# Patient Record
Sex: FEMALE | Race: WHITE | URBAN - METROPOLITAN AREA
[De-identification: names, ages, dates, MRNs, and addresses within clinical notes are randomized per-mention and may not be internally consistent; named-entity substitution may affect disease eponyms.]

---

## 2017-01-26 ENCOUNTER — OFFICE VISIT (OUTPATIENT)
Dept: FAMILY MEDICINE CLINIC | Age: 37
End: 2017-01-26

## 2017-01-26 VITALS
HEIGHT: 67 IN | WEIGHT: 184 LBS | RESPIRATION RATE: 16 BRPM | BODY MASS INDEX: 28.88 KG/M2 | DIASTOLIC BLOOD PRESSURE: 80 MMHG | SYSTOLIC BLOOD PRESSURE: 114 MMHG | HEART RATE: 96 BPM | TEMPERATURE: 98.1 F

## 2017-01-26 DIAGNOSIS — K21.00 GASTROESOPHAGEAL REFLUX DISEASE WITH ESOPHAGITIS: ICD-10-CM

## 2017-01-26 DIAGNOSIS — J01.90 ACUTE SINUSITIS, RECURRENCE NOT SPECIFIED, UNSPECIFIED LOCATION: Primary | ICD-10-CM

## 2017-01-26 DIAGNOSIS — R42 VERTIGO: ICD-10-CM

## 2017-01-26 PROCEDURE — 99214 OFFICE O/P EST MOD 30 MIN: CPT | Performed by: FAMILY MEDICINE

## 2017-01-26 RX ORDER — CEFUROXIME AXETIL 250 MG/1
250 TABLET ORAL 2 TIMES DAILY
Qty: 20 TABLET | Refills: 0 | Status: SHIPPED | OUTPATIENT
Start: 2017-01-26 | End: 2017-02-05

## 2017-01-26 RX ORDER — RANITIDINE 150 MG/1
150 TABLET ORAL 2 TIMES DAILY
Qty: 60 TABLET | Refills: 3 | Status: SHIPPED | OUTPATIENT
Start: 2017-01-26 | End: 2017-06-10 | Stop reason: SDUPTHER

## 2017-03-27 RX ORDER — LEVOTHYROXINE SODIUM 0.05 MG/1
TABLET ORAL
Qty: 90 TABLET | Refills: 0 | Status: SHIPPED | OUTPATIENT
Start: 2017-03-27 | End: 2017-04-01

## 2017-04-01 RX ORDER — LEVOTHYROXINE SODIUM 0.05 MG/1
TABLET ORAL
Qty: 90 TABLET | Refills: 0 | Status: SHIPPED | OUTPATIENT
Start: 2017-04-01 | End: 2017-06-26

## 2017-06-10 DIAGNOSIS — K21.00 GASTROESOPHAGEAL REFLUX DISEASE WITH ESOPHAGITIS: ICD-10-CM

## 2017-06-10 RX ORDER — RANITIDINE 150 MG/1
TABLET ORAL
Qty: 60 TABLET | Refills: 2 | Status: SHIPPED | OUTPATIENT
Start: 2017-06-10 | End: 2017-08-30

## 2017-06-26 RX ORDER — LEVOTHYROXINE SODIUM 0.05 MG/1
TABLET ORAL
Qty: 90 TABLET | Refills: 0 | Status: SHIPPED | OUTPATIENT
Start: 2017-06-26 | End: 2017-08-30 | Stop reason: SDUPTHER

## 2017-08-21 ENCOUNTER — TELEPHONE (OUTPATIENT)
Dept: FAMILY MEDICINE CLINIC | Age: 37
End: 2017-08-21

## 2017-08-30 ENCOUNTER — OFFICE VISIT (OUTPATIENT)
Dept: FAMILY MEDICINE CLINIC | Age: 37
End: 2017-08-30

## 2017-08-30 VITALS
HEIGHT: 67 IN | SYSTOLIC BLOOD PRESSURE: 114 MMHG | DIASTOLIC BLOOD PRESSURE: 72 MMHG | RESPIRATION RATE: 16 BRPM | TEMPERATURE: 98.8 F | HEART RATE: 80 BPM | BODY MASS INDEX: 28.09 KG/M2 | WEIGHT: 179 LBS

## 2017-08-30 DIAGNOSIS — F33.42 RECURRENT MAJOR DEPRESSIVE DISORDER, IN FULL REMISSION (HCC): ICD-10-CM

## 2017-08-30 DIAGNOSIS — Z00.00 WELL ADULT HEALTH CHECK: Primary | ICD-10-CM

## 2017-08-30 DIAGNOSIS — E03.9 ACQUIRED HYPOTHYROIDISM: ICD-10-CM

## 2017-08-30 DIAGNOSIS — F41.9 ANXIETY: ICD-10-CM

## 2017-08-30 DIAGNOSIS — E55.9 VITAMIN D DEFICIENCY: ICD-10-CM

## 2017-08-30 DIAGNOSIS — I10 ESSENTIAL HYPERTENSION: ICD-10-CM

## 2017-08-30 DIAGNOSIS — Z11.4 SCREENING FOR HIV (HUMAN IMMUNODEFICIENCY VIRUS): ICD-10-CM

## 2017-08-30 DIAGNOSIS — K21.00 GASTROESOPHAGEAL REFLUX DISEASE WITH ESOPHAGITIS: ICD-10-CM

## 2017-08-30 DIAGNOSIS — E78.2 MIXED HYPERLIPIDEMIA: ICD-10-CM

## 2017-08-30 DIAGNOSIS — Z11.59 NEED FOR HEPATITIS C SCREENING TEST: ICD-10-CM

## 2017-08-30 DIAGNOSIS — K22.70 BARRETT'S ESOPHAGUS WITHOUT DYSPLASIA: ICD-10-CM

## 2017-08-30 LAB
A/G RATIO: 1.5 (ref 1.1–2.2)
ALBUMIN SERPL-MCNC: 4.4 G/DL (ref 3.4–5)
ALP BLD-CCNC: 44 U/L (ref 40–129)
ALT SERPL-CCNC: 9 U/L (ref 10–40)
ANION GAP SERPL CALCULATED.3IONS-SCNC: 13 MMOL/L (ref 3–16)
AST SERPL-CCNC: 15 U/L (ref 15–37)
BILIRUB SERPL-MCNC: 0.7 MG/DL (ref 0–1)
BUN BLDV-MCNC: 8 MG/DL (ref 7–20)
CALCIUM SERPL-MCNC: 9.7 MG/DL (ref 8.3–10.6)
CHLORIDE BLD-SCNC: 99 MMOL/L (ref 99–110)
CHOLESTEROL, TOTAL: 197 MG/DL (ref 0–199)
CO2: 26 MMOL/L (ref 21–32)
CREAT SERPL-MCNC: 0.6 MG/DL (ref 0.6–1.1)
GFR AFRICAN AMERICAN: >60
GFR NON-AFRICAN AMERICAN: >60
GLOBULIN: 2.9 G/DL
GLUCOSE BLD-MCNC: 87 MG/DL (ref 70–99)
HDLC SERPL-MCNC: 74 MG/DL (ref 40–60)
HEPATITIS C ANTIBODY INTERPRETATION: NORMAL
LDL CHOLESTEROL CALCULATED: 111 MG/DL
POTASSIUM SERPL-SCNC: 4.8 MMOL/L (ref 3.5–5.1)
SODIUM BLD-SCNC: 138 MMOL/L (ref 136–145)
TOTAL PROTEIN: 7.3 G/DL (ref 6.4–8.2)
TRIGL SERPL-MCNC: 60 MG/DL (ref 0–150)
TSH SERPL DL<=0.05 MIU/L-ACNC: 1.68 UIU/ML (ref 0.27–4.2)
VITAMIN D 25-HYDROXY: 33.9 NG/ML
VLDLC SERPL CALC-MCNC: 12 MG/DL

## 2017-08-30 PROCEDURE — 99395 PREV VISIT EST AGE 18-39: CPT | Performed by: FAMILY MEDICINE

## 2017-08-30 PROCEDURE — G0444 DEPRESSION SCREEN ANNUAL: HCPCS | Performed by: FAMILY MEDICINE

## 2017-08-30 RX ORDER — OMEPRAZOLE 20 MG/1
20 CAPSULE, DELAYED RELEASE ORAL DAILY
Qty: 30 CAPSULE | Refills: 3 | COMMUNITY
Start: 2017-08-30 | End: 2019-07-05

## 2017-08-30 RX ORDER — ZOLPIDEM TARTRATE 10 MG/1
10 TABLET ORAL NIGHTLY PRN
COMMUNITY

## 2017-08-30 RX ORDER — LEVOTHYROXINE SODIUM 0.05 MG/1
TABLET ORAL
Qty: 90 TABLET | Refills: 3 | Status: SHIPPED | OUTPATIENT
Start: 2017-08-30 | End: 2018-09-13 | Stop reason: SDUPTHER

## 2017-08-30 ASSESSMENT — PATIENT HEALTH QUESTIONNAIRE - PHQ9
6. FEELING BAD ABOUT YOURSELF - OR THAT YOU ARE A FAILURE OR HAVE LET YOURSELF OR YOUR FAMILY DOWN: 2
8. MOVING OR SPEAKING SO SLOWLY THAT OTHER PEOPLE COULD HAVE NOTICED. OR THE OPPOSITE, BEING SO FIGETY OR RESTLESS THAT YOU HAVE BEEN MOVING AROUND A LOT MORE THAN USUAL: 2
4. FEELING TIRED OR HAVING LITTLE ENERGY: 2
9. THOUGHTS THAT YOU WOULD BE BETTER OFF DEAD, OR OF HURTING YOURSELF: 0
3. TROUBLE FALLING OR STAYING ASLEEP: 3
1. LITTLE INTEREST OR PLEASURE IN DOING THINGS: 2
5. POOR APPETITE OR OVEREATING: 2
10. IF YOU CHECKED OFF ANY PROBLEMS, HOW DIFFICULT HAVE THESE PROBLEMS MADE IT FOR YOU TO DO YOUR WORK, TAKE CARE OF THINGS AT HOME, OR GET ALONG WITH OTHER PEOPLE: 0
SUM OF ALL RESPONSES TO PHQ9 QUESTIONS 1 & 2: 4
SUM OF ALL RESPONSES TO PHQ QUESTIONS 1-9: 17
2. FEELING DOWN, DEPRESSED OR HOPELESS: 2
7. TROUBLE CONCENTRATING ON THINGS, SUCH AS READING THE NEWSPAPER OR WATCHING TELEVISION: 2

## 2018-09-05 ENCOUNTER — OFFICE VISIT (OUTPATIENT)
Dept: FAMILY MEDICINE CLINIC | Age: 38
End: 2018-09-05

## 2018-09-05 VITALS
RESPIRATION RATE: 16 BRPM | SYSTOLIC BLOOD PRESSURE: 118 MMHG | DIASTOLIC BLOOD PRESSURE: 78 MMHG | HEART RATE: 72 BPM | BODY MASS INDEX: 28.72 KG/M2 | HEIGHT: 67 IN | TEMPERATURE: 99 F | WEIGHT: 183 LBS

## 2018-09-05 DIAGNOSIS — E03.9 ACQUIRED HYPOTHYROIDISM: Primary | ICD-10-CM

## 2018-09-05 DIAGNOSIS — E03.9 ACQUIRED HYPOTHYROIDISM: ICD-10-CM

## 2018-09-05 LAB — TSH SERPL DL<=0.05 MIU/L-ACNC: 1.59 UIU/ML (ref 0.27–4.2)

## 2018-09-05 PROCEDURE — 99213 OFFICE O/P EST LOW 20 MIN: CPT | Performed by: FAMILY MEDICINE

## 2018-09-05 ASSESSMENT — PATIENT HEALTH QUESTIONNAIRE - PHQ9
SUM OF ALL RESPONSES TO PHQ9 QUESTIONS 1 & 2: 0
SUM OF ALL RESPONSES TO PHQ QUESTIONS 1-9: 0
SUM OF ALL RESPONSES TO PHQ QUESTIONS 1-9: 0
2. FEELING DOWN, DEPRESSED OR HOPELESS: 0
1. LITTLE INTEREST OR PLEASURE IN DOING THINGS: 0

## 2018-09-05 NOTE — PROGRESS NOTES
Scribe documentation: Viet Loera am scribing for Magnus Shone, MD. Electronically signed by Kristyn Zavaleta  on 9/5/2018 at 1:00 PM  MD Attestation: Valerioin Lalo,  personally performed the services described in this documentation, as scribed by the user listed above, and it is both accurate and complete. I agree with the Chief Complaint, ROS, and Past Histories independently gathered by the clinical support staff. 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 Exercise: walking twice a day     Seatbelt use: Always     The ASCVD Risk score (Ramos Frias, et al., 2013) failed to calculate for the following reasons: The 2013 ASCVD risk score is only valid for ages 36 to 78  Prior to Visit Medications    Medication Sig Taking?  Authorizing Provider   zolpidem (AMBIEN) 10 MG tablet Take 10 mg by mouth nightly as needed for Sleep Yes Historical Provider, MD   levothyroxine (SYNTHROID) 50 MCG tablet TAKE ONE TABLET BY MOUTH DAILY Yes Leigh Byers MD   omeprazole (PRILOSEC) 20 MG delayed release capsule Take 1 capsule by mouth Daily Yes Leigh Byers MD   vitamin D (ERGOCALCIFEROL) 89536 UNITS CAPS capsule Take 1 capsule by mouth Twice a Week Yes Jessy Engel APRN - MANUEL   albuterol

## 2018-09-13 DIAGNOSIS — E03.9 ACQUIRED HYPOTHYROIDISM: ICD-10-CM

## 2018-09-14 RX ORDER — LEVOTHYROXINE SODIUM 0.05 MG/1
TABLET ORAL
Qty: 90 TABLET | Refills: 2 | Status: SHIPPED | OUTPATIENT
Start: 2018-09-14 | End: 2018-09-17 | Stop reason: SDUPTHER

## 2019-06-15 DIAGNOSIS — E03.9 ACQUIRED HYPOTHYROIDISM: ICD-10-CM

## 2019-06-17 RX ORDER — LEVOTHYROXINE SODIUM 0.05 MG/1
TABLET ORAL
Qty: 90 TABLET | Refills: 0 | Status: SHIPPED | OUTPATIENT
Start: 2019-06-17 | End: 2019-07-05 | Stop reason: SDUPTHER

## 2019-07-05 ENCOUNTER — OFFICE VISIT (OUTPATIENT)
Dept: FAMILY MEDICINE CLINIC | Age: 39
End: 2019-07-05
Payer: COMMERCIAL

## 2019-07-05 VITALS
RESPIRATION RATE: 16 BRPM | WEIGHT: 153 LBS | SYSTOLIC BLOOD PRESSURE: 118 MMHG | DIASTOLIC BLOOD PRESSURE: 72 MMHG | HEART RATE: 70 BPM | BODY MASS INDEX: 24.01 KG/M2 | HEIGHT: 67 IN | TEMPERATURE: 98 F

## 2019-07-05 DIAGNOSIS — F33.42 RECURRENT MAJOR DEPRESSIVE DISORDER, IN FULL REMISSION (HCC): ICD-10-CM

## 2019-07-05 DIAGNOSIS — Z00.00 WELL ADULT HEALTH CHECK: Primary | ICD-10-CM

## 2019-07-05 DIAGNOSIS — Z00.00 WELL ADULT HEALTH CHECK: ICD-10-CM

## 2019-07-05 DIAGNOSIS — E55.9 VITAMIN D DEFICIENCY: ICD-10-CM

## 2019-07-05 DIAGNOSIS — E03.9 ACQUIRED HYPOTHYROIDISM: ICD-10-CM

## 2019-07-05 DIAGNOSIS — K21.00 GASTROESOPHAGEAL REFLUX DISEASE WITH ESOPHAGITIS: ICD-10-CM

## 2019-07-05 LAB
A/G RATIO: 1.8 (ref 1.1–2.2)
ALBUMIN SERPL-MCNC: 4.4 G/DL (ref 3.4–5)
ALP BLD-CCNC: 42 U/L (ref 40–129)
ALT SERPL-CCNC: 11 U/L (ref 10–40)
ANION GAP SERPL CALCULATED.3IONS-SCNC: 9 MMOL/L (ref 3–16)
AST SERPL-CCNC: 18 U/L (ref 15–37)
BASOPHILS ABSOLUTE: 0 K/UL (ref 0–0.2)
BASOPHILS RELATIVE PERCENT: 0.3 %
BILIRUB SERPL-MCNC: 0.6 MG/DL (ref 0–1)
BUN BLDV-MCNC: 12 MG/DL (ref 7–20)
CALCIUM SERPL-MCNC: 9.3 MG/DL (ref 8.3–10.6)
CHLORIDE BLD-SCNC: 103 MMOL/L (ref 99–110)
CO2: 26 MMOL/L (ref 21–32)
CREAT SERPL-MCNC: 0.6 MG/DL (ref 0.6–1.1)
EOSINOPHILS ABSOLUTE: 0.2 K/UL (ref 0–0.6)
EOSINOPHILS RELATIVE PERCENT: 4 %
GFR AFRICAN AMERICAN: >60
GFR NON-AFRICAN AMERICAN: >60
GLOBULIN: 2.5 G/DL
GLUCOSE BLD-MCNC: 81 MG/DL (ref 70–99)
HCT VFR BLD CALC: 40.1 % (ref 36–48)
HEMOGLOBIN: 13.8 G/DL (ref 12–16)
LYMPHOCYTES ABSOLUTE: 1 K/UL (ref 1–5.1)
LYMPHOCYTES RELATIVE PERCENT: 22.2 %
MCH RBC QN AUTO: 30.8 PG (ref 26–34)
MCHC RBC AUTO-ENTMCNC: 34.3 G/DL (ref 31–36)
MCV RBC AUTO: 89.6 FL (ref 80–100)
MONOCYTES ABSOLUTE: 0.3 K/UL (ref 0–1.3)
MONOCYTES RELATIVE PERCENT: 5.6 %
NEUTROPHILS ABSOLUTE: 3.1 K/UL (ref 1.7–7.7)
NEUTROPHILS RELATIVE PERCENT: 67.9 %
PDW BLD-RTO: 13.7 % (ref 12.4–15.4)
PLATELET # BLD: 243 K/UL (ref 135–450)
PMV BLD AUTO: 7.8 FL (ref 5–10.5)
POTASSIUM SERPL-SCNC: 4.6 MMOL/L (ref 3.5–5.1)
RBC # BLD: 4.47 M/UL (ref 4–5.2)
SODIUM BLD-SCNC: 138 MMOL/L (ref 136–145)
TOTAL PROTEIN: 6.9 G/DL (ref 6.4–8.2)
TSH SERPL DL<=0.05 MIU/L-ACNC: 2.33 UIU/ML (ref 0.27–4.2)
WBC # BLD: 4.6 K/UL (ref 4–11)

## 2019-07-05 PROCEDURE — 99396 PREV VISIT EST AGE 40-64: CPT | Performed by: FAMILY MEDICINE

## 2019-07-05 RX ORDER — LEVOTHYROXINE SODIUM 0.05 MG/1
TABLET ORAL
Qty: 90 TABLET | Refills: 0 | Status: SHIPPED | OUTPATIENT
Start: 2019-07-05 | End: 2019-12-10 | Stop reason: SDUPTHER

## 2019-07-05 RX ORDER — VALACYCLOVIR HYDROCHLORIDE 1 G/1
1000 TABLET, FILM COATED ORAL PRN
Status: SHIPPED | COMMUNITY
Start: 2019-07-05

## 2019-07-05 RX ORDER — CITALOPRAM 10 MG/1
10 TABLET ORAL DAILY
COMMUNITY

## 2019-07-05 RX ORDER — LORAZEPAM 0.5 MG/1
0.5 TABLET ORAL EVERY 6 HOURS PRN
COMMUNITY
End: 2019-07-05 | Stop reason: SDUPTHER

## 2019-07-05 RX ORDER — LORAZEPAM 0.5 MG/1
0.5 TABLET ORAL 2 TIMES DAILY
COMMUNITY
Start: 2019-07-05 | End: 2020-10-02 | Stop reason: ALTCHOICE

## 2019-07-05 ASSESSMENT — PATIENT HEALTH QUESTIONNAIRE - PHQ9
2. FEELING DOWN, DEPRESSED OR HOPELESS: 0
SUM OF ALL RESPONSES TO PHQ QUESTIONS 1-9: 0
SUM OF ALL RESPONSES TO PHQ9 QUESTIONS 1 & 2: 0
SUM OF ALL RESPONSES TO PHQ QUESTIONS 1-9: 0
1. LITTLE INTEREST OR PLEASURE IN DOING THINGS: 0

## 2019-07-05 NOTE — PATIENT INSTRUCTIONS
INSTRUCTIONS  · NEXT APPOINTMENT: Please schedule annual complete physical (30 minutes) in one year. · PLEASE TAKE THIS FORM TO CHECK-OUT WINDOW TO SCHEDULE NEXT VISIT. · PLEASE GET BLOODWORK DRAWN TODAY ON FIRST FLOOR in 170. Take orders with you. RESULTS- most blood tests back in couple days. We will call you if any problems. If bloodwork good, you will get letter in mail or notified thru [address] (if signed up) within 2 weeks. If you do not, please call office. · Please get flu vaccine when available in fall. Can get either at this office or at stores such as [employer] and [employer]. · Ask GYN to fax PAP result to Dr. Serina Odom at [phone].

## 2019-07-07 LAB — VITAMIN D 25-HYDROXY: 40.2 NG/ML

## 2019-07-08 LAB
CHOLESTEROL, TOTAL: 193 MG/DL (ref 0–199)
HDLC SERPL-MCNC: 74 MG/DL (ref 40–60)
LDL CHOLESTEROL CALCULATED: 109 MG/DL
TRIGL SERPL-MCNC: 52 MG/DL (ref 0–150)
VLDLC SERPL CALC-MCNC: 10 MG/DL

## 2019-12-10 ENCOUNTER — TELEPHONE (OUTPATIENT)
Dept: FAMILY MEDICINE CLINIC | Age: 39
End: 2019-12-10

## 2020-06-04 RX ORDER — LEVOTHYROXINE SODIUM 0.05 MG/1
TABLET ORAL
Qty: 90 TABLET | Refills: 0 | Status: SHIPPED | OUTPATIENT
Start: 2020-06-04 | End: 2020-08-31

## 2020-08-31 ENCOUNTER — PATIENT MESSAGE (OUTPATIENT)
Dept: FAMILY MEDICINE CLINIC | Age: 40
End: 2020-08-31

## 2020-08-31 RX ORDER — LEVOTHYROXINE SODIUM 0.05 MG/1
TABLET ORAL
Qty: 90 TABLET | Refills: 0 | OUTPATIENT
Start: 2020-08-31

## 2020-08-31 RX ORDER — LEVOTHYROXINE SODIUM 0.05 MG/1
TABLET ORAL
Qty: 90 TABLET | Refills: 0 | Status: SHIPPED | OUTPATIENT
Start: 2020-08-31 | End: 2020-09-01

## 2020-08-31 NOTE — TELEPHONE ENCOUNTER
From: Augustin Boyle  To: ANN Caruso  Sent: 1/79/1962 10:38 AM EDT  Subject: RE:(No subject)    Michael Reddy,    I hope you are well. I have scheduled a first available appointment for October 2nd at 1pm. I need a refill on my hypothyroidism medication within the next 7 days. How can I ensure I will have a supply of medication? Thank you for your help. All the Joycelyndie Older      ----- Message -----   From:ANN Caruso   NNMN:6/86/3734 9:36 AM EDT   To:Vivi Villagran   Subject:    Good morning. You were due for a yearly physical and fasting labs in July. Please call [phone] to schedule.  Thank you

## 2020-09-01 RX ORDER — LEVOTHYROXINE SODIUM 0.05 MG/1
TABLET ORAL
Qty: 90 TABLET | Refills: 0 | Status: SHIPPED | OUTPATIENT
Start: 2020-09-01 | End: 2020-10-02 | Stop reason: SDUPTHER

## 2020-10-02 ENCOUNTER — OFFICE VISIT (OUTPATIENT)
Dept: FAMILY MEDICINE CLINIC | Age: 40
End: 2020-10-02
Payer: COMMERCIAL

## 2020-10-02 VITALS
RESPIRATION RATE: 10 BRPM | OXYGEN SATURATION: 99 % | DIASTOLIC BLOOD PRESSURE: 80 MMHG | SYSTOLIC BLOOD PRESSURE: 120 MMHG | HEART RATE: 76 BPM | BODY MASS INDEX: 23.7 KG/M2 | WEIGHT: 151 LBS | HEIGHT: 67 IN

## 2020-10-02 DIAGNOSIS — Z00.00 WELL ADULT EXAM: ICD-10-CM

## 2020-10-02 DIAGNOSIS — E55.9 VITAMIN D DEFICIENCY: ICD-10-CM

## 2020-10-02 DIAGNOSIS — E03.9 ACQUIRED HYPOTHYROIDISM: ICD-10-CM

## 2020-10-02 LAB
A/G RATIO: 1.9 (ref 1.1–2.2)
ALBUMIN SERPL-MCNC: 4.6 G/DL (ref 3.4–5)
ALP BLD-CCNC: 41 U/L (ref 40–129)
ALT SERPL-CCNC: 24 U/L (ref 10–40)
ANION GAP SERPL CALCULATED.3IONS-SCNC: 10 MMOL/L (ref 3–16)
AST SERPL-CCNC: 21 U/L (ref 15–37)
BILIRUB SERPL-MCNC: 0.5 MG/DL (ref 0–1)
BUN BLDV-MCNC: 16 MG/DL (ref 7–20)
CALCIUM SERPL-MCNC: 9.6 MG/DL (ref 8.3–10.6)
CHLORIDE BLD-SCNC: 104 MMOL/L (ref 99–110)
CO2: 26 MMOL/L (ref 21–32)
CREAT SERPL-MCNC: 0.6 MG/DL (ref 0.6–1.1)
GFR AFRICAN AMERICAN: >60
GFR NON-AFRICAN AMERICAN: >60
GLOBULIN: 2.4 G/DL
GLUCOSE BLD-MCNC: 82 MG/DL (ref 70–99)
POTASSIUM SERPL-SCNC: 5.6 MMOL/L (ref 3.5–5.1)
SODIUM BLD-SCNC: 140 MMOL/L (ref 136–145)
TOTAL PROTEIN: 7 G/DL (ref 6.4–8.2)
TSH REFLEX: 1.59 UIU/ML (ref 0.27–4.2)
VITAMIN D 25-HYDROXY: 68.8 NG/ML

## 2020-10-02 PROCEDURE — 99396 PREV VISIT EST AGE 40-64: CPT | Performed by: NURSE PRACTITIONER

## 2020-10-02 RX ORDER — LEVOTHYROXINE SODIUM 0.05 MG/1
TABLET ORAL
Qty: 90 TABLET | Refills: 3 | Status: SHIPPED | OUTPATIENT
Start: 2020-10-02 | End: 2021-11-01

## 2020-10-02 NOTE — PROGRESS NOTES
History and Physical      Hillary Quintero  YOB: 1980    Date of Service:  10/2/2020    Chief Complaint:   Hillary Quintero is a 38 y.o. female who presents for complete physical examination. Chief Complaint   Patient presents with    Annual Exam       HPI: Patient is here for her preventative physical.    Hypothyroidism: Recent symptoms: none. She denies fatigue, weight gain, weight loss, cold intolerance, heat intolerance, hair loss, dry skin, constipation, diarrhea, edema, anxiety, tremor, palpitations and dysphagia. Patient is  taking her medication consistently on an empty stomach. No results found for: [facility]  Lab Results   Component Value Date    TSH 2.33 07/05/2019    TSH 1.59 09/05/2018    TSH 1.68 08/30/2017     Depression/anxiety: mood is doing well. Patient follows with psychiatrist Dr. Rona Orourke. Follows every 3 months. No longer needing lorazepam.     Insomnia: stable. Follows with Dr. Rona Orourke. Planning on decreasing ambien dose. Allergic rhinitis: uses OTC nasal spray- provides good relief    Reports that her periods have gotten heavier. She will discuss this with her GYN at her next appointment in November.     Wt Readings from Last 3 Encounters:   10/02/20 151 lb (68.5 kg)   07/05/19 153 lb (69.4 kg)   09/05/18 183 lb (83 kg)     BP Readings from Last 3 Encounters:   10/02/20 120/80   07/05/19 118/72   09/05/18 118/78       Patient Active Problem List   Diagnosis    Acquired hypothyroidism    Anxiety    Idiopathic urticaria    Allergic rhinitis, seasonal    Thoracic back pain    Vitamin D deficiency    Gastroesophageal reflux disease with esophagitis    Recurrent major depressive disorder, in full remission [facility])       Preventive Care:  Health Maintenance   Topic Date Due    Cervical cancer screen  08/20/2017    TSH testing  07/05/2020    Flu vaccine (1) 09/01/2020    DTaP/Tdap/Td vaccine (3 - Td) 06/01/2021    Lipid screen  07/05/2024    Hepatitis A vaccine  Aged Out  Hepatitis B vaccine  Aged Out    Hib vaccine  Aged Out    Meningococcal (ACWY) vaccine  Aged Out    Pneumococcal 0-64 years Vaccine  Aged Out    HIV screen  Discontinued      Hx abnormal PAP: yes - was normal in 2019.   Follows with GYN Dr[facility] Mark Ponce at [facility]  Self-breast exams: yes  Previous DEXA scan: n/a  Last eye exam: 9/25/2020, normal  Exercise: runs on treadmill daily  Seatbelt use: always  Lipid panel:   Lab Results   Component Value Date    CHOL 193 07/05/2019    TRIG 52 07/05/2019    HDL 74 (H) 07/05/2019    LDLCALC 109 (H) 07/05/2019      The 10-year ASCVD risk score (Cait Rodriguez, et al., 2013) is: 0.3%    Values used to calculate the score:      Age: 38 years      Sex: Female      Is Non- : No      Diabetic: No      Tobacco smoker: No      Systolic Blood Pressure: 006 mmHg      Is BP treated: No      HDL Cholesterol: 74 mg/dL      Total Cholesterol: 193 mg/dL      Advance Directive: patient reports that she is working on this    Immunization History   Administered Date(s) Administered    Influenza Vaccine, unspecified formulation 09/18/2009, 11/01/2016    Tdap (Boostrix, Adacel) 09/18/2009, 06/01/2011       Allergies   Allergen Reactions    Doxycycline     Hyoscyamine Sulfate     Pamelor     Wellbutrin [Bupropion] Other (See Comments)     Increased Anxiety     Pcn [Penicillins] Other (See Comments)     Yeast infection    Prednisolone Nausea And Vomiting     Emotional problems     Outpatient Medications Marked as Taking for the 10/2/20 encounter (Office Visit) with RAMILA Iverson CNP   Medication Sig Dispense Refill    levothyroxine (SYNTHROID) 50 MCG tablet TAKE ONE TABLET BY MOUTH DAILY 90 tablet 0    citalopram (CELEXA) 10 MG tablet Take 10 mg by mouth daily      vitamin D (CHOLECALCIFEROL) 1000 UNIT TABS tablet Take 1 tablet by mouth daily 90 tablet 3    zolpidem (AMBIEN) 10 MG tablet Take 10 mg by mouth nightly as needed for Sleep         Past Medical History:   Diagnosis Date    Allergic rhinitis, seasonal 2011    Depression 2011    Genital herpes 2011    GERD (gastroesophageal reflux disease) 2011    Hyperlipemia 2011    Hypothyroidism 2011    Nephrolithiasis 2011    Self mutilation 2011     Past Surgical History:   Procedure Laterality Date    BREAST BIOPSY      benign    CHOLECYSTECTOMY, LAPAROSCOPIC  2010    COLONOSCOPY      neg    HERNIA REPAIR      left inguinal   [address]    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    Food insecurity     Worry: Not on file     Inability: Not on file    Transportation needs     Medical: Not on file     Non-medical: Not on file   Tobacco Use    Smoking status: Former Smoker     Last attempt to quit: 2009     Years since quittin.6    Smokeless tobacco: Never Used    Tobacco comment: advised not to resume   Substance and Sexual Activity    Alcohol use:  Yes     Alcohol/week: 0.0 standard drinks     Comment: rarely    Drug use: No    Sexual activity: Not on file   Lifestyle    Physical activity     Days per week: Not on file     Minutes per session: Not on file    Stress: Not on file   Relationships    Social connections     Talks on phone: Not on file Gets together: Not on file     Attends Rastafari service: Not on file     Active member of club or organization: Not on file     Attends meetings of clubs or organizations: Not on file     Relationship status: Not on file    Intimate partner violence     Fear of current or ex partner: Not on file     Emotionally abused: Not on file     Physically abused: Not on file     Forced sexual activity: Not on file   Other Topics Concern    Not on file   Social History Narrative    Lives with spouse daughter. Exercise: walking twice a day     Seatbelt use: Always       Review of Systems:  A comprehensive review of systems was negative except for what was noted in the HPI. Physical Exam:   Vitals:    10/02/20 1254   BP: 120/80   Site: Right Upper Arm   Position: Sitting   Cuff Size: Medium Adult   Pulse: 76   Resp: 10   SpO2: 99%   Weight: 151 lb (68.5 kg)   Height: 5' 7\" (1.702 m)     Body mass index is 23.65 kg/m². Constitutional: She is oriented to person, place, and time. She appears well-developed and well-nourished. No distress. HEENT:   Head: Normocephalic and atraumatic. Right Ear: Tympanic membrane, external ear and ear canal normal.   Left Ear: Tympanic membrane, external ear and ear canal normal.   Nose: Nose normal.   Mouth/Throat: Oropharynx is clear and moist, and mucous membranes are normal.  There is no cervical adenopathy. Eyes: Conjunctivae and extraocular motions are normal. Pupils are equal, round, and reactive to light. Neck: Neck supple. No JVD present. Carotid bruit is not present. No mass and no thyromegaly present. Cardiovascular: Normal rate, regular rhythm, normal heart sounds and intact distal pulses. Exam reveals no gallop and no friction rub. No murmur heard. Pulmonary/Chest: Effort normal and breath sounds normal. No respiratory distress. She has no wheezes, rhonchi or rales. Abdominal: Soft, non-tender.  Bowel sounds and aorta are normal. She exhibits no organomegaly, mass or bruit. Musculoskeletal: Normal range of motion, no synovitis. She exhibits no edema. Neurological: She is alert and oriented to person, place, and time. She has normal reflexes. No cranial nerve deficit. Coordination normal.   Skin: Skin is warm and dry. There is no rash or erythema. No suspicious lesions noted. Psychiatric: She has a normal mood and affect. Her speech is normal and behavior is normal. Judgment, cognition and memory are normal.     Assessment/Plan:    Branden Bello was seen today for annual exam.    Diagnoses and all orders for this visit:    Well adult exam  -     Comprehensive Metabolic Panel; Future  -     TSH with Reflex; Future  Healthy lifestyles reviewed: diet, aerobic exercise, sunscreen, vision and dental exams. Acquired hypothyroidism  -     levothyroxine (SYNTHROID) 50 MCG tablet; TAKE ONE TABLET BY MOUTH DAILY  -     TSH with Reflex; Future    Recurrent major depressive disorder, in full remission (HCC)  Stable on citalopram 10 mg daily. Continue f/u with psychiatrist Dr. Oscar Gates. Anxiety  Improved. On citalopram 10 mg daily. No longer needing lorazepam. Continue f/u with psychiatrist Dr. Oscar Gates. Insomnia, unspecified type  Improved. On zolpidem 10 mg qhs. Reports that she is going to work on weaning off medication. Continue f/u with psychiatrist Dr. Oscar Gates. Vitamin D deficiency  -     Vitamin D 25 Hydroxy; Future  On vitamin D 1000 IU daily    Continue f/u with GYN- scheduled for visit next month. She will obtain her flu vaccine at work.

## 2021-03-11 ENCOUNTER — TELEPHONE (OUTPATIENT)
Dept: FAMILY MEDICINE CLINIC | Age: 41
End: 2021-03-11

## 2021-03-11 NOTE — TELEPHONE ENCOUNTER
----- Message from Blane Valenzuela sent at 3/11/2021 11:23 AM EST -----  Subject: Message to Provider    QUESTIONS  Information for Provider? Patient in in need of a biometrics paper filled   out for insurance through her employer. She had a physical on 10/02/2020. Is she able to bring in this paperwork to have it filled out or would she   have to have another physical?   ---------------------------------------------------------------------------  --------------  [address]  What is the best way for the office to contact you? OK to leave message on   voicemail  Preferred Call Back Phone Number? [phone]  ---------------------------------------------------------------------------  --------------  SCRIPT ANSWERS  Relationship to Patient?  Self

## 2021-03-11 NOTE — TELEPHONE ENCOUNTER
Called pt and advised to fax over paperwork and if Dr Raydell Canavan has any concerns we will let her know. If not we will fill out the paperwork. Also advised to get the lab completed.

## 2021-03-17 ENCOUNTER — TELEPHONE (OUTPATIENT)
Dept: FAMILY MEDICINE CLINIC | Age: 41
End: 2021-03-17

## 2021-03-17 DIAGNOSIS — E87.5 HYPERKALEMIA: ICD-10-CM

## 2021-03-17 LAB
ANION GAP SERPL CALCULATED.3IONS-SCNC: 7 MMOL/L (ref 3–16)
BUN BLDV-MCNC: 12 MG/DL (ref 7–20)
CALCIUM SERPL-MCNC: 9 MG/DL (ref 8.3–10.6)
CHLORIDE BLD-SCNC: 103 MMOL/L (ref 99–110)
CO2: 27 MMOL/L (ref 21–32)
CREAT SERPL-MCNC: 0.6 MG/DL (ref 0.6–1.1)
GFR AFRICAN AMERICAN: >60
GFR NON-AFRICAN AMERICAN: >60
GLUCOSE BLD-MCNC: 85 MG/DL (ref 70–99)
POTASSIUM SERPL-SCNC: 4.7 MMOL/L (ref 3.5–5.1)
SODIUM BLD-SCNC: 137 MMOL/L (ref 136–145)

## 2021-11-01 DIAGNOSIS — E03.9 ACQUIRED HYPOTHYROIDISM: ICD-10-CM

## 2021-11-01 RX ORDER — LEVOTHYROXINE SODIUM 0.05 MG/1
TABLET ORAL
Qty: 90 TABLET | Refills: 0 | Status: SHIPPED | OUTPATIENT
Start: 2021-11-01 | End: 2021-11-24 | Stop reason: SDUPTHER

## 2021-11-24 ENCOUNTER — OFFICE VISIT (OUTPATIENT)
Dept: FAMILY MEDICINE CLINIC | Age: 41
End: 2021-11-24
Payer: COMMERCIAL

## 2021-11-24 VITALS
BODY MASS INDEX: 27.31 KG/M2 | DIASTOLIC BLOOD PRESSURE: 74 MMHG | HEIGHT: 67 IN | HEART RATE: 74 BPM | WEIGHT: 174 LBS | SYSTOLIC BLOOD PRESSURE: 120 MMHG | OXYGEN SATURATION: 99 % | RESPIRATION RATE: 16 BRPM

## 2021-11-24 DIAGNOSIS — Z23 NEED FOR TDAP VACCINATION: ICD-10-CM

## 2021-11-24 DIAGNOSIS — E55.9 VITAMIN D DEFICIENCY: ICD-10-CM

## 2021-11-24 DIAGNOSIS — Z00.00 WELL ADULT EXAM: ICD-10-CM

## 2021-11-24 DIAGNOSIS — G47.00 INSOMNIA, UNSPECIFIED TYPE: ICD-10-CM

## 2021-11-24 DIAGNOSIS — F33.42 RECURRENT MAJOR DEPRESSIVE DISORDER, IN FULL REMISSION (HCC): ICD-10-CM

## 2021-11-24 DIAGNOSIS — F41.9 ANXIETY: ICD-10-CM

## 2021-11-24 DIAGNOSIS — E03.9 ACQUIRED HYPOTHYROIDISM: ICD-10-CM

## 2021-11-24 LAB
A/G RATIO: 1.6 (ref 1.1–2.2)
ALBUMIN SERPL-MCNC: 4.4 G/DL (ref 3.4–5)
ALP BLD-CCNC: 48 U/L (ref 40–129)
ALT SERPL-CCNC: 27 U/L (ref 10–40)
ANION GAP SERPL CALCULATED.3IONS-SCNC: 12 MMOL/L (ref 3–16)
AST SERPL-CCNC: 31 U/L (ref 15–37)
BASOPHILS ABSOLUTE: 0 K/UL (ref 0–0.2)
BASOPHILS RELATIVE PERCENT: 0.3 %
BILIRUB SERPL-MCNC: 0.8 MG/DL (ref 0–1)
BUN BLDV-MCNC: 12 MG/DL (ref 7–20)
CALCIUM SERPL-MCNC: 9.3 MG/DL (ref 8.3–10.6)
CHLORIDE BLD-SCNC: 103 MMOL/L (ref 99–110)
CHOLESTEROL, TOTAL: 230 MG/DL (ref 0–199)
CO2: 25 MMOL/L (ref 21–32)
CREAT SERPL-MCNC: 0.7 MG/DL (ref 0.6–1.1)
EOSINOPHILS ABSOLUTE: 0.2 K/UL (ref 0–0.6)
EOSINOPHILS RELATIVE PERCENT: 4.2 %
GFR AFRICAN AMERICAN: >60
GFR NON-AFRICAN AMERICAN: >60
GLUCOSE BLD-MCNC: 89 MG/DL (ref 70–99)
HCT VFR BLD CALC: 42.4 % (ref 36–48)
HDLC SERPL-MCNC: 101 MG/DL (ref 40–60)
HEMOGLOBIN: 13.7 G/DL (ref 12–16)
LDL CHOLESTEROL CALCULATED: 117 MG/DL
LYMPHOCYTES ABSOLUTE: 1 K/UL (ref 1–5.1)
LYMPHOCYTES RELATIVE PERCENT: 21 %
MCH RBC QN AUTO: 27.2 PG (ref 26–34)
MCHC RBC AUTO-ENTMCNC: 32.3 G/DL (ref 31–36)
MCV RBC AUTO: 84.2 FL (ref 80–100)
MONOCYTES ABSOLUTE: 0.4 K/UL (ref 0–1.3)
MONOCYTES RELATIVE PERCENT: 8.5 %
NEUTROPHILS ABSOLUTE: 3 K/UL (ref 1.7–7.7)
NEUTROPHILS RELATIVE PERCENT: 66 %
PDW BLD-RTO: 15.3 % (ref 12.4–15.4)
PLATELET # BLD: 258 K/UL (ref 135–450)
PMV BLD AUTO: 7.9 FL (ref 5–10.5)
POTASSIUM SERPL-SCNC: 5 MMOL/L (ref 3.5–5.1)
RBC # BLD: 5.04 M/UL (ref 4–5.2)
SODIUM BLD-SCNC: 140 MMOL/L (ref 136–145)
TOTAL PROTEIN: 7.2 G/DL (ref 6.4–8.2)
TRIGL SERPL-MCNC: 61 MG/DL (ref 0–150)
TSH REFLEX: 2.15 UIU/ML (ref 0.27–4.2)
VITAMIN D 25-HYDROXY: 78 NG/ML
VLDLC SERPL CALC-MCNC: 12 MG/DL
WBC # BLD: 4.6 K/UL (ref 4–11)

## 2021-11-24 PROCEDURE — 99396 PREV VISIT EST AGE 40-64: CPT | Performed by: NURSE PRACTITIONER

## 2021-11-24 PROCEDURE — 90471 IMMUNIZATION ADMIN: CPT | Performed by: NURSE PRACTITIONER

## 2021-11-24 PROCEDURE — 90715 TDAP VACCINE 7 YRS/> IM: CPT | Performed by: NURSE PRACTITIONER

## 2021-11-24 RX ORDER — LEVOTHYROXINE SODIUM 0.05 MG/1
TABLET ORAL
Qty: 90 TABLET | Refills: 2 | Status: SHIPPED | OUTPATIENT
Start: 2021-11-24 | End: 2022-10-28 | Stop reason: SDUPTHER

## 2021-11-24 NOTE — PROGRESS NOTES
History and Physical      Leanna Haney  YOB: 1980    Date of Service:  11/24/2021    Chief Complaint:   Leanna Haney is a 42 y.o. female who presents for complete physical examination. Chief Complaint   Patient presents with    Annual Exam     pt is having issues with weight change but that is due to stress, she is having sweats around her period time        HPI: Patient is here for her preventative physical.    Hypothyroidism: Recent symptoms: dry skin. She denies fatigue, weight loss, cold intolerance, heat intolerance, hair loss, constipation, diarrhea, edema, anxiety, tremor, palpitations and dysphagia. Patient is  taking her medication consistently on an empty stomach. Lab Results   Component Value Date    TSHREFLEX 1.59 10/02/2020     Lab Results   Component Value Date    TSH 2.33 07/05/2019    TSH 1.59 09/05/2018    TSH 1.68 08/30/2017     Depression/anxiety: mood is doing well. On citalopram.  Patient follows with psychiatrist Dr. Daniel Hernandez. Follows every 3 months. No longer needing lorazepam.     Insomnia: stable. Follows with Dr. Daniel Hernandez. Planning on decreasing ambien dose in the future. Reports that her periods have gotten heavier and then lighter. She will discuss this with her GYN at her next appointment in Dec. She is getting sweating episodes around her periods. Exercise- runs daily for 15 minutes   Diet- tries to eat healthy, but could do better. Reports that she has done a lot of stress eating this past year and weight has increased.      Wt Readings from Last 3 Encounters:   11/24/21 174 lb (78.9 kg)   10/02/20 151 lb (68.5 kg)   07/05/19 153 lb (69.4 kg)     BP Readings from Last 3 Encounters:   11/24/21 120/74   10/02/20 120/80   07/05/19 118/72       Patient Active Problem List   Diagnosis    Acquired hypothyroidism    Anxiety    Idiopathic urticaria    Allergic rhinitis, seasonal    Thoracic back pain    Vitamin D deficiency    Gastroesophageal reflux disease with esophagitis    Recurrent major depressive disorder, in full remission [facility])       Preventive Care:  Health Maintenance   Topic Date Due    Cervical cancer screen  Never done    DTaP/Tdap/Td vaccine (3 - Td or Tdap) 06/01/2021    TSH testing  10/02/2021    Lipid screen  07/05/2024    Flu vaccine  Completed    COVID-19 Vaccine  Completed    Hepatitis C screen  Completed    Hepatitis A vaccine  Aged Out    Hepatitis B vaccine  Aged Out    Hib vaccine  Aged Out    Meningococcal (ACWY) vaccine  Aged Out    Pneumococcal 0-64 years Vaccine  Aged Out    HIV screen  Discontinued      Hx abnormal PAP: yes - was normal in 2019.   Follow[facility] with GYN [facility] Tah[facility]aird [facility]  Self-breast exams: yes  Previous DEXA scan: n/a  Last eye exam: 8/2020, normal  Exercise: runs on treadmill daily  Seatbelt use: always  Lipid panel:   Lab Results   Component Value Date    CHOL 193 07/05/2019    TRIG 52 07/05/2019    HDL 74 (H) 07/05/2019    LDLCALC 109 (H) 07/05/2019      The 10-year ASCVD risk score (David Pierson, et al., 2013) is: 0.3%    Values used to calculate the score:      Age: 42 years      Sex: Female      Is Non- : No      Diabetic: No      Tobacco smoker: No      Systolic Blood Pressure: 650 mmHg      Is BP treated: No      HDL Cholesterol: 74 mg/dL      Total Cholesterol: 193 mg/dL      Immunization History   Administered Date(s) Administered    COVID-19, Pfizer, PF, 30mcg/0.3mL 03/28/2021, 04/18/2021    Influenza Vaccine, unspecified formulation 09/18/2009, 11/01/2016    Tdap (Boostrix, Adacel) 09/18/2009, 06/01/2011       Allergies   Allergen Reactions    Doxycycline     Hyoscyamine Sulfate     Pamelor     Wellbutrin [Bupropion] Other (See Comments)     Increased Anxiety     Pcn [Penicillins] Other (See Comments)     Yeast infection    Prednisolone Nausea And Vomiting     Emotional problems     Outpatient Medications Marked as Taking for the 11/24/21 encounter (Office Visit) with Jalil Finch, APRN - CNP   Medication Sig Dispense Refill    levothyroxine (SYNTHROID) 50 MCG tablet TAKE ONE TABLET BY MOUTH DAILY 90 tablet 0    citalopram (CELEXA) 10 MG tablet Take 10 mg by mouth daily      valACYclovir (VALTREX) 1 g tablet Take 1 tablet by mouth as needed      vitamin D (CHOLECALCIFEROL) 1000 UNIT TABS tablet Take 1 tablet by mouth daily 90 tablet 3    zolpidem (AMBIEN) 10 MG tablet Take 10 mg by mouth nightly as needed for Sleep         Past Medical History:   Diagnosis Date    Allergic rhinitis, seasonal 2011    Depression 2011    Genital herpes 2011    GERD (gastroesophageal reflux disease) 2011    Hyperlipemia 2011    Hypothyroidism 2011    Nephrolithiasis 2011    Self mutilation 2011     Past Surgical History:   Procedure Laterality Date    BREAST BIOPSY      benign    CHOLECYSTECTOMY, LAPAROSCOPIC  2010    COLONOSCOPY      neg    HERNIA REPAIR      left inguinal    [facility]    denies surgery    OVARIAN CYST SURGERY      age 1    TONSILLECTOMY  10/07    UPPER GASTROINTESTINAL ENDOSCOPY  2011    Tam Aiken MD      Family History   Problem Relation Age of Onset    Cancer Maternal Grandmother 72        uterine cancer    Cancer Other 80        Maternal Greatgrandmother Breast cancer    Cancer Father 48        [place]     Social History     Socioeconomic History    Marital status:      Spouse name: Not on file    Number of children: Not on file    Years of education: Not on file    Highest education level: Not on file   Occupational History    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 Exercise: walking twice a day     Seatbelt use: Always     Social Determinants of Health     Financial Resource Strain:     Difficulty of Paying Living Expenses: Not on file   Food Insecurity:     Worried About Running Out of Food in the Last Year: Not on file    Eitan of Food in the Last Year: Not on file   Transportation Needs:     Lack of Transportation (Medical): Not on file    Lack of Transportation (Non-Medical): Not on file   Physical Activity:     Days of Exercise per Week: Not on file    Minutes of Exercise per Session: Not on file   Stress:     Feeling of Stress : Not on file   Social Connections:     Frequency of Communication with Friends and Family: Not on file    Frequency of Social Gatherings with Friends and Family: Not on file    Attends Druze Services: Not on file    Active Member of [address] [place] [address] or Organizations: Not on file    Attends Club or Organization Meetings: Not on file    Marital Status: Not on file   Intimate Partner Violence:     Fear of Current or Ex-Partner: Not on file    Emotionally Abused: Not on file    Physically Abused: Not on file    Sexually Abused: Not on file   Housing Stability:     Unable to Pay for Housing in the Last Year: Not on file    Number of [place] in the Last Year: Not on file    Unstable Housing in the Last Year: Not on file       Review of Systems:  A comprehensive review of systems was negative except for what was noted in the HPI. Physical Exam:   Vitals:    11/24/21 0825   BP: 120/74   Site: Right Upper Arm   Position: Sitting   Cuff Size: Medium Adult   Pulse: 74   Resp: 16   SpO2: 99%   Weight: 174 lb (78.9 kg)   Height: 5' 7\" (1.702 m)     Body mass index is 27.25 kg/m². Constitutional: She is oriented to person, place, and time.  She appears well-developed and well-nourished. No distress. HEENT:   Head: Normocephalic and atraumatic. Right Ear: Tympanic membrane, external ear and ear canal normal.   Left Ear: Tympanic membrane, external ear and ear canal normal.   Nose: Nose normal.   Mouth/Throat: Oropharynx is clear and moist, and mucous membranes are normal.  There is no cervical adenopathy. Eyes: Conjunctivae and extraocular motions are normal. Pupils are equal, round, and reactive to light. Neck: Neck supple. No JVD present. Carotid bruit is not present. No mass and no thyromegaly present. Cardiovascular: Normal rate, regular rhythm, normal heart sounds and intact distal pulses. Exam reveals no gallop and no friction rub. No murmur heard. Pulmonary/Chest: Effort normal and breath sounds normal. No respiratory distress. She has no wheezes, rhonchi or rales. Abdominal: Soft, non-tender. Musculoskeletal: Normal range of motion, no synovitis. She exhibits no edema. Neurological: She is alert and oriented to person, place, and time. She has normal reflexes. No cranial nerve deficit. Coordination normal.   Skin: Skin is warm and dry. There is no rash or erythema. No suspicious lesions noted. Psychiatric: She has a normal mood and affect. Her speech is normal and behavior is normal. Judgment, cognition and memory are normal.     Assessment/Plan:    Sangeeta Holden was seen today for annual exam.    Diagnoses and all orders for this visit:    Well adult exam  -     Comprehensive Metabolic Panel; Future  -     TSH with Reflex; Future  - CBC  Healthy lifestyles reviewed: diet, aerobic exercise, sunscreen, vision and dental exams. Advised to obtain her COVID-19 booster at her pharmacy or work. Acquired hypothyroidism  -     levothyroxine (SYNTHROID) 50 MCG tablet; TAKE ONE TABLET BY MOUTH DAILY  -     TSH with Reflex; Future    Recurrent major depressive disorder, in full remission (HCC)  Stable on citalopram 10 mg daily.  Continue f/u with psychiatrist  Pantera Das. Anxiety  Improved. On citalopram 10 mg daily. No longer needing lorazepam. Continue f/u with psychiatrist Dr. Pantera Das. Insomnia, unspecified type  Improved. On zolpidem 10 mg qhs. Reports that she is going to work on weaning off medication sometime in the future. Continue f/u with psychiatrist Dr. Pantera Das. Vitamin D deficiency  -     Vitamin D 25 Hydroxy; Future  On vitamin D 1000 IU daily    Need for Tdap vaccination  -     Tdap (age 6y and older) IM ([facility])    Continue f/u with GYN- scheduled for visit next month.

## 2022-07-01 ENCOUNTER — OFFICE VISIT (OUTPATIENT)
Dept: FAMILY MEDICINE CLINIC | Age: 42
End: 2022-07-01
Payer: COMMERCIAL

## 2022-07-01 VITALS
HEIGHT: 67 IN | RESPIRATION RATE: 14 BRPM | HEART RATE: 74 BPM | BODY MASS INDEX: 27.06 KG/M2 | WEIGHT: 172.4 LBS | OXYGEN SATURATION: 99 % | DIASTOLIC BLOOD PRESSURE: 68 MMHG | SYSTOLIC BLOOD PRESSURE: 110 MMHG | TEMPERATURE: 98.6 F

## 2022-07-01 DIAGNOSIS — L60.3 DYSTROPHIC NAIL: Primary | ICD-10-CM

## 2022-07-01 PROCEDURE — 99212 OFFICE O/P EST SF 10 MIN: CPT | Performed by: NURSE PRACTITIONER

## 2022-07-01 ASSESSMENT — PATIENT HEALTH QUESTIONNAIRE - PHQ9
3. TROUBLE FALLING OR STAYING ASLEEP: 0
SUM OF ALL RESPONSES TO PHQ QUESTIONS 1-9: 0
6. FEELING BAD ABOUT YOURSELF - OR THAT YOU ARE A FAILURE OR HAVE LET YOURSELF OR YOUR FAMILY DOWN: 0
2. FEELING DOWN, DEPRESSED OR HOPELESS: 0
10. IF YOU CHECKED OFF ANY PROBLEMS, HOW DIFFICULT HAVE THESE PROBLEMS MADE IT FOR YOU TO DO YOUR WORK, TAKE CARE OF THINGS AT HOME, OR GET ALONG WITH OTHER PEOPLE: 0
8. MOVING OR SPEAKING SO SLOWLY THAT OTHER PEOPLE COULD HAVE NOTICED. OR THE OPPOSITE, BEING SO FIGETY OR RESTLESS THAT YOU HAVE BEEN MOVING AROUND A LOT MORE THAN USUAL: 0
1. LITTLE INTEREST OR PLEASURE IN DOING THINGS: 0
4. FEELING TIRED OR HAVING LITTLE ENERGY: 0
5. POOR APPETITE OR OVEREATING: 0
SUM OF ALL RESPONSES TO PHQ QUESTIONS 1-9: 0
SUM OF ALL RESPONSES TO PHQ QUESTIONS 1-9: 0
9. THOUGHTS THAT YOU WOULD BE BETTER OFF DEAD, OR OF HURTING YOURSELF: 0
SUM OF ALL RESPONSES TO PHQ9 QUESTIONS 1 & 2: 0
SUM OF ALL RESPONSES TO PHQ QUESTIONS 1-9: 0
7. TROUBLE CONCENTRATING ON THINGS, SUCH AS READING THE NEWSPAPER OR WATCHING TELEVISION: 0

## 2022-07-01 NOTE — PROGRESS NOTES
7/1/2022    This is a 36 y.o. female   Chief Complaint   Patient presents with    Toe Pain     started in february after a pedicure. nail is discolored and looks bruised at base of lt great toe. no known injury. Mei Harrison HPI  Patient reports that in Feb 2022 she started exercising more with running. Her left great toenail was elevated and noticed that it was bruised. She got a pedicure about 2 weeks prior. Reports that the top of her nail was shaved down. Last month noticed that she had a white area in the middle of her nail were her toenail has grown out. Tried fungi cure twice a day and noticed that her nail became thinner. Does not have pain in toe now. Will get soreness at time with a lot of activity. Still has bruising at the nail bed, but does not cause pain. Patient Active Problem List   Diagnosis    Acquired hypothyroidism    Anxiety    Idiopathic urticaria    Allergic rhinitis, seasonal    Thoracic back pain    Vitamin D deficiency    Gastroesophageal reflux disease with esophagitis    Recurrent major depressive disorder, in full remission ([facility] [address]    Insomnia          Current Outpatient Medications   Medication Sig Dispense Refill    levothyroxine (SYNTHROID) 50 MCG tablet TAKE ONE TABLET BY MOUTH DAILY 90 tablet 2    citalopram (CELEXA) 10 MG tablet Take 10 mg by mouth daily      valACYclovir (VALTREX) 1 g tablet Take 1 tablet by mouth as needed      vitamin D (CHOLECALCIFEROL) 1000 UNIT TABS tablet Take 1 tablet by mouth daily 90 tablet 3    zolpidem (AMBIEN) 10 MG tablet Take 10 mg by mouth nightly as needed for Sleep       No current facility-administered medications for this visit.        Allergies   Allergen Reactions    Doxycycline     Hyoscyamine Sulfate     Pamelor     Wellbutrin [Bupropion] Other (See Comments)     Increased Anxiety     Pcn [Penicillins] Other (See Comments)     Yeast infection    Prednisolone Nausea And Vomiting     Emotional problems       Review of Systems   Constitutional: Negative for activity change and fever. Skin: Negative for wound. Vitals:    07/01/22 0845   BP: 110/68   Site: Right Upper Arm   Position: Sitting   Cuff Size: Medium Adult   Pulse: 74   Resp: 14   Temp: 98.6 °F (37 °C)   TempSrc: Oral   SpO2: 99%   Weight: 172 lb 6.4 oz (78.2 kg)   Height: 5' 7\" (1.702 m)       Body mass index is 27 kg/m². Wt Readings from Last 3 Encounters:   07/01/22 172 lb 6.4 oz (78.2 kg)   11/24/21 174 lb (78.9 kg)   10/02/20 151 lb (68.5 kg)       BP Readings from Last 3 Encounters:   07/01/22 110/68   11/24/21 120/74   10/02/20 120/80       Physical Exam  Vitals and nursing note reviewed. Constitutional:       Appearance: She is well-developed. HENT:      Head: Normocephalic and atraumatic. Pulmonary:      Effort: Pulmonary effort is normal.   Feet:      Comments: Left great toenail middle surface is absent. Has uneven surface area. Nail bed with bruising. Skin around nail is normal. Non tender to palpation. Skin:     General: Skin is warm and dry. Neurological:      Mental Status: She is alert and oriented to person, place, and time. Psychiatric:         Behavior: Behavior normal.         Thought Content: Thought content normal.         Judgment: Judgment normal.         Assessmentand Plan  Samreen Yousif was seen today for toe pain. Diagnoses and all orders for this visit:    Dystrophic nail  Had damage to nail either from running or from pedicure. Does not appear to have a fungal infection. Does not need to use OTC fungal treatment. Discussed that new nail growing appears to be normal and flat. Discussed that nail will take about 3 or more months to grow out. Can trim nail as needed.    To return with concerning symptoms or if new nail becomes abnormal.     Return in about 5 months (around 12/1/2022), or if symptoms worsen or fail to improve, for physical.

## 2022-10-28 DIAGNOSIS — E03.9 ACQUIRED HYPOTHYROIDISM: ICD-10-CM

## 2022-10-28 RX ORDER — LEVOTHYROXINE SODIUM 0.05 MG/1
TABLET ORAL
Qty: 90 TABLET | Refills: 2 | Status: SHIPPED | OUTPATIENT
Start: 2022-10-28

## 2022-11-29 ENCOUNTER — OFFICE VISIT (OUTPATIENT)
Dept: FAMILY MEDICINE CLINIC | Age: 42
End: 2022-11-29
Payer: COMMERCIAL

## 2022-11-29 VITALS
WEIGHT: 162.6 LBS | BODY MASS INDEX: 25.52 KG/M2 | DIASTOLIC BLOOD PRESSURE: 76 MMHG | RESPIRATION RATE: 14 BRPM | OXYGEN SATURATION: 99 % | TEMPERATURE: 98.4 F | SYSTOLIC BLOOD PRESSURE: 120 MMHG | HEART RATE: 66 BPM | HEIGHT: 67 IN

## 2022-11-29 DIAGNOSIS — F41.9 ANXIETY: ICD-10-CM

## 2022-11-29 DIAGNOSIS — L98.7 EXCESS SKIN OF ABDOMINAL WALL: ICD-10-CM

## 2022-11-29 DIAGNOSIS — E03.9 ACQUIRED HYPOTHYROIDISM: ICD-10-CM

## 2022-11-29 DIAGNOSIS — F33.42 RECURRENT MAJOR DEPRESSIVE DISORDER, IN FULL REMISSION (HCC): ICD-10-CM

## 2022-11-29 DIAGNOSIS — G47.00 INSOMNIA, UNSPECIFIED TYPE: ICD-10-CM

## 2022-11-29 DIAGNOSIS — Z00.00 WELL ADULT EXAM: Primary | ICD-10-CM

## 2022-11-29 DIAGNOSIS — E55.9 VITAMIN D DEFICIENCY: ICD-10-CM

## 2022-11-29 DIAGNOSIS — Z00.00 WELL ADULT EXAM: ICD-10-CM

## 2022-11-29 LAB
A/G RATIO: 1.6 (ref 1.1–2.2)
ALBUMIN SERPL-MCNC: 3.9 G/DL (ref 3.4–5)
ALP BLD-CCNC: 38 U/L (ref 40–129)
ALT SERPL-CCNC: 15 U/L (ref 10–40)
ANION GAP SERPL CALCULATED.3IONS-SCNC: 10 MMOL/L (ref 3–16)
AST SERPL-CCNC: 20 U/L (ref 15–37)
BASOPHILS ABSOLUTE: 0 K/UL (ref 0–0.2)
BASOPHILS RELATIVE PERCENT: 0.4 %
BILIRUB SERPL-MCNC: 0.3 MG/DL (ref 0–1)
BUN BLDV-MCNC: 13 MG/DL (ref 7–20)
CALCIUM SERPL-MCNC: 9.1 MG/DL (ref 8.3–10.6)
CHLORIDE BLD-SCNC: 100 MMOL/L (ref 99–110)
CHOLESTEROL, TOTAL: 256 MG/DL (ref 0–199)
CO2: 27 MMOL/L (ref 21–32)
CREAT SERPL-MCNC: 0.5 MG/DL (ref 0.6–1.1)
EOSINOPHILS ABSOLUTE: 0.3 K/UL (ref 0–0.6)
EOSINOPHILS RELATIVE PERCENT: 8 %
GFR SERPL CREATININE-BSD FRML MDRD: >60 ML/MIN/{1.73_M2}
GLUCOSE BLD-MCNC: 80 MG/DL (ref 70–99)
HCT VFR BLD CALC: 40.5 % (ref 36–48)
HDLC SERPL-MCNC: 83 MG/DL (ref 40–60)
HEMOGLOBIN: 13.7 G/DL (ref 12–16)
LDL CHOLESTEROL CALCULATED: 160 MG/DL
LYMPHOCYTES ABSOLUTE: 0.7 K/UL (ref 1–5.1)
LYMPHOCYTES RELATIVE PERCENT: 23.3 %
MCH RBC QN AUTO: 29.9 PG (ref 26–34)
MCHC RBC AUTO-ENTMCNC: 33.7 G/DL (ref 31–36)
MCV RBC AUTO: 88.5 FL (ref 80–100)
MONOCYTES ABSOLUTE: 0.3 K/UL (ref 0–1.3)
MONOCYTES RELATIVE PERCENT: 8 %
NEUTROPHILS ABSOLUTE: 1.9 K/UL (ref 1.7–7.7)
NEUTROPHILS RELATIVE PERCENT: 60.3 %
PDW BLD-RTO: 14.9 % (ref 12.4–15.4)
PLATELET # BLD: 198 K/UL (ref 135–450)
PMV BLD AUTO: 8.3 FL (ref 5–10.5)
POTASSIUM SERPL-SCNC: 4.1 MMOL/L (ref 3.5–5.1)
RBC # BLD: 4.58 M/UL (ref 4–5.2)
SODIUM BLD-SCNC: 137 MMOL/L (ref 136–145)
TOTAL PROTEIN: 6.3 G/DL (ref 6.4–8.2)
TRIGL SERPL-MCNC: 65 MG/DL (ref 0–150)
TSH REFLEX: 2.04 UIU/ML (ref 0.27–4.2)
VITAMIN D 25-HYDROXY: 68.3 NG/ML
VLDLC SERPL CALC-MCNC: 13 MG/DL
WBC # BLD: 3.2 K/UL (ref 4–11)

## 2022-11-29 PROCEDURE — 99396 PREV VISIT EST AGE 40-64: CPT | Performed by: NURSE PRACTITIONER

## 2022-11-29 SDOH — ECONOMIC STABILITY: FOOD INSECURITY: WITHIN THE PAST 12 MONTHS, THE FOOD YOU BOUGHT JUST DIDN'T LAST AND YOU DIDN'T HAVE MONEY TO GET MORE.: NEVER TRUE

## 2022-11-29 SDOH — ECONOMIC STABILITY: FOOD INSECURITY: WITHIN THE PAST 12 MONTHS, YOU WORRIED THAT YOUR FOOD WOULD RUN OUT BEFORE YOU GOT MONEY TO BUY MORE.: NEVER TRUE

## 2022-11-29 ASSESSMENT — SOCIAL DETERMINANTS OF HEALTH (SDOH): HOW HARD IS IT FOR YOU TO PAY FOR THE VERY BASICS LIKE FOOD, HOUSING, MEDICAL CARE, AND HEATING?: NOT HARD AT ALL

## 2022-11-29 NOTE — PROGRESS NOTES
History and Physical      Suman Abel  YOB: 1980    Date of Service:  11/29/2022    Chief Complaint:   Suman Abel is a 36 y.o. female who presents for complete physical examination. Chief Complaint   Patient presents with    Annual Exam     Would like her lt big toe looked at. HPI: Patient is here for her preventative physical. She reports that she is doing well. Hypothyroidism: Recent symptoms: none. She denies fatigue, weight loss, cold intolerance, heat intolerance, hair loss, constipation, diarrhea, edema, anxiety, tremor, palpitations and dysphagia. Patient is  taking her medication consistently on an empty stomach. Lab Results   Component Value Date/Time    TSHREFLEX 2.15 11/24/2021 08:56 AM    TSHREFLEX 1.59 10/02/2020 02:08 PM     Lab Results   Component Value Date    TSH 2.33 07/05/2019    TSH 1.59 09/05/2018    TSH 1.68 08/30/2017     Depression/anxiety: mood is doing well. On citalopram.  Patient follows with psychiatrist Dr. Tico Amin. Follows every 3 months. No longer needing lorazepam.     Insomnia: stable. [facility] Dr[facility] Exercise- runs daily for 30-45 minutes   Diet- tries to eat healthy. Has been trying to decrease weight     Her weight was 325 lbs when she was in her early 25s. Since decreasing weight, has excess skin on abd. Would like to inquire about removal. Skin will rub and be irritated at times.      Wt Readings from Last 3 Encounters:   11/29/22 162 lb 9.6 oz (73.8 kg)   07/01/22 172 lb 6.4 oz (78.2 kg)   11/24/21 174 lb (78.9 kg)     BP Readings from Last 3 Encounters:   11/29/22 120/76   07/01/22 110/68   11/24/21 120/74       Patient Active Problem List   Diagnosis    Acquired hypothyroidism    Anxiety    Idiopathic urticaria    Allergic rhinitis, seasonal    Thoracic back pain    Vitamin D deficiency    Gastroesophageal reflux disease with esophagitis    Recurrent major depressive disorder, in full remission ([facility] [address]    Insomnia Preventive Care:  Health Maintenance   Topic Date Due    Cervical cancer screen  Never done    COVID-19 Vaccine (3 - Booster for Pfizer series) 06/13/2021    Depression Monitoring  07/01/2023    Lipids  11/24/2026    DTaP/Tdap/Td vaccine (4 - Td or Tdap) 11/24/2031    Flu vaccine  Completed    Hepatitis C screen  Completed    Hepatitis A vaccine  Aged Out    Hib vaccine  Aged Out    Meningococcal (ACWY) vaccine  Aged Out    Pneumococcal 0-64 years Vaccine  Aged Out    HIV screen  Discontinued      Hx abnormal PAP: yes - was normal in 2019. Follows with GYN Dr. Jose Alejandro Petty at [address] [facility] exams: yes  Previous DEXA scan: n/a  Last eye exam: 2021, normal  Exercise: runs daily   Seatbelt use: always  Lipid panel:   Lab Results   Component Value Date    CHOL 230 (H) 11/24/2021    TRIG 61 11/24/2021     (H) 11/24/2021    LDLCALC 117 (H) 11/24/2021      The ASCVD Risk score (Enid DK, et al., 2019) failed to calculate for the following reasons:     The valid HDL cholesterol range is 20 to 100 mg/dL      Immunization History   Administered Date(s) Administered    COVID-19, PFIZER PURPLE top, DILUTE for use, (age 15 y+), 30mcg/0.3mL 03/28/2021, 04/18/2021    Influenza Vaccine, unspecified formulation 09/18/2009, 11/01/2016    Tdap (Boostrix, Adacel) 09/18/2009, 06/01/2011, 11/24/2021       Allergies   Allergen Reactions    Doxycycline     Hyoscyamine Sulfate     Pamelor     Wellbutrin [Bupropion] Other (See Comments)     Increased Anxiety     Pcn [Penicillins] Other (See Comments)     Yeast infection    Prednisolone Nausea And Vomiting     Emotional problems     Outpatient Medications Marked as Taking for the 11/29/22 encounter (Office Visit) with RAMILA Albarran CNP   Medication Sig Dispense Refill    levothyroxine (SYNTHROID) 50 MCG tablet TAKE ONE TABLET BY MOUTH DAILY 90 tablet 2    citalopram (CELEXA) 10 MG tablet Take 10 mg by mouth daily      valACYclovir (VALTREX) 1 g tablet Take 1 tablet by mouth as needed      vitamin D (CHOLECALCIFEROL) 1000 UNIT TABS tablet Take 1 tablet by mouth daily 90 tablet 3    zolpidem (AMBIEN) 10 MG tablet Take 10 mg by mouth nightly as needed for Sleep         Past Medical History:   Diagnosis Date    Allergic rhinitis, seasonal 2011    Depression 2011    Genital herpes 2011    GERD (gastroesophageal reflux disease) 2011    Hyperlipemia 2011    Hypothyroidism 2011    Nephrolithiasis 2011    Self mutilation 2011     Past Surgical History:   Procedure Laterality Date    BREAST BIOPSY      benign    CHOLECYSTECTOMY, LAPAROSCOPIC  2010    COLONOSCOPY      neg    HERNIA REPAIR      left inguinal    [facility]    denies surgery    OVARIAN CYST SURGERY  1982    age 1    TONSILLECTOMY  10/07    UPPER GASTROINTESTINAL ENDOSCOPY  2011    Landen Coffman MD      Family History   Problem Relation Age of Onset    Cancer Maternal Grandmother 72        uterine cancer    Cancer Other 80        Maternal Greatgrandmother Breast cancer    Cancer Father 48        Kidney CA     Social History     Socioeconomic History    Marital status:      Spouse name: Not on file    Number of children: Not on file    Years of education: Not on file    Highest education level: Not on file   Occupational History    Occupation: [employer] at [address] [place] [address] Drive Use    Smoking status: Former     Types: Cigarettes     Quit date: 2009     Years since quittin.8    Smokeless tobacco: Never    Tobacco comments:     advised not to resume   Substance and Sexual Activity    Alcohol use: Yes     Alcohol/week: 0.0 standard drinks     Comment: rarely    Drug use: No    Sexual activity: Not on file   Other Topics Concern    Not on file   Social History Narrative    Lives with spouse daughter.     Exercise: walking twice a day     Seatbelt use: Always     Social Determinants of Health     Financial Resource Strain: Low Risk Difficulty of Paying Living Expenses: Not hard at all   Food Insecurity: No Food Insecurity    Worried About Running Out of Food in the Last Year: Never true    Ran Out of Food in the Last Year: Never true   Transportation Needs: Not on file   Physical Activity: Not on file   Stress: Not on file   Social Connections: Not on file   Intimate Partner Violence: Not on file   Housing Stability: Not on file       Review of Systems:  A comprehensive review of systems was negative except for what was noted in the HPI. Physical Exam:   Vitals:    11/29/22 0817   BP: 120/76   Site: Right Upper Arm   Position: Sitting   Cuff Size: Medium Adult   Pulse: 66   Resp: 14   Temp: 98.4 °F (36.9 °C)   TempSrc: Oral   SpO2: 99%   Weight: 162 lb 9.6 oz (73.8 kg)   Height: 5' 7\" (1.702 m)     Body mass index is 25.47 kg/m². Constitutional: She is oriented to person, place, and time. She appears well-developed and well-nourished. No distress. HEENT:   Head: Normocephalic and atraumatic. Right Ear: Tympanic membrane, external ear and ear canal normal.   Left Ear: Tympanic membrane, external ear and ear canal normal.   Nose: Nose normal.   Mouth/Throat: Oropharynx is clear and moist, and mucous membranes are normal.  There is no cervical adenopathy. Eyes: Conjunctivae and extraocular motions are normal. Pupils are equal, round, and reactive to light. Neck: Neck supple. No JVD present. Carotid bruit is not present. No mass and no thyromegaly present. Cardiovascular: Normal rate, regular rhythm, normal heart sounds and intact distal pulses. Exam reveals no gallop and no friction rub. No murmur heard. Pulmonary/Chest: Effort normal and breath sounds normal. No respiratory distress. She has no wheezes, rhonchi or rales. Abdominal: Soft, non-tender. Loose abd skin noted. Musculoskeletal: Normal range of motion, no synovitis. She exhibits no edema. Neurological: She is alert and oriented to person, place, and time.  She has normal reflexes. No cranial nerve deficit. Coordination normal.   Skin: Skin is warm and dry. There is no rash or erythema. No suspicious lesions noted. Left great toenail is indented. No fungal disease noted. Psychiatric: She has a normal mood and affect. Her speech is normal and behavior is normal. Judgment, cognition and memory are normal.     Assessment/Plan:    Shannon Soriano was seen today for annual exam.    Diagnoses and all orders for this visit:    Well adult exam  -     Comprehensive Metabolic Panel; Future  -     TSH with Reflex; Future  -     Vitamin D 25 Hydroxy; Future  -     CBC with Auto Differential; Future  -     Lipid Panel; Future  Healthy lifestyles reviewed: diet, aerobic exercise, sunscreen, vision and dental exams. Continue f/u with GYN- scheduled for visit next month. Declines COVID-19 booster. Acquired hypothyroidism  -     levothyroxine (SYNTHROID) 50 MCG tablet; TAKE ONE TABLET BY MOUTH DAILY  -     TSH with Reflex; Future    Recurrent major depressive disorder, in full remission (HCC)  Stable on citalopram 10 mg daily. Continue f/u with psychiatrist Dr. Joellyn Osler. Anxiety  Improved. On citalopram 10 mg daily. No longer needing lorazepam. Continue f/u with psychiatrist Dr. Joellyn Osler. Insomnia, unspecified type  Improved. On zolpidem 10 mg qhs. Continue f/u with psychiatrist Dr. Joellyn Osler. Vitamin D deficiency  -     Vitamin D 25 Hydroxy; Future  On vitamin D 1000 IU daily    Excess skin of abdominal wall  -     Denise Loya MD, Plastic Surgery, [facility]  S/p weight loss. Refer to plastic surgery for evaluation.

## 2022-11-30 DIAGNOSIS — D70.9 NEUTROPENIA, UNSPECIFIED TYPE (HCC): Primary | ICD-10-CM

## 2023-05-18 DIAGNOSIS — D70.9 NEUTROPENIA, UNSPECIFIED TYPE (HCC): ICD-10-CM

## 2023-05-18 LAB
BASOPHILS # BLD: 0 K/UL (ref 0–0.2)
BASOPHILS NFR BLD: 0.4 %
DEPRECATED RDW RBC AUTO: 13.9 % (ref 12.4–15.4)
EOSINOPHIL # BLD: 0.3 K/UL (ref 0–0.6)
EOSINOPHIL NFR BLD: 7.4 %
HCT VFR BLD AUTO: 40.4 % (ref 36–48)
HGB BLD-MCNC: 13.7 G/DL (ref 12–16)
LYMPHOCYTES # BLD: 1 K/UL (ref 1–5.1)
LYMPHOCYTES NFR BLD: 25.8 %
MCH RBC QN AUTO: 30.6 PG (ref 26–34)
MCHC RBC AUTO-ENTMCNC: 33.8 G/DL (ref 31–36)
MCV RBC AUTO: 90.6 FL (ref 80–100)
MONOCYTES # BLD: 0.3 K/UL (ref 0–1.3)
MONOCYTES NFR BLD: 8.5 %
NEUTROPHILS # BLD: 2.1 K/UL (ref 1.7–7.7)
NEUTROPHILS NFR BLD: 57.9 %
PLATELET # BLD AUTO: 203 K/UL (ref 135–450)
PMV BLD AUTO: 8.5 FL (ref 5–10.5)
RBC # BLD AUTO: 4.46 M/UL (ref 4–5.2)
WBC # BLD AUTO: 3.7 K/UL (ref 4–11)

## 2023-07-23 DIAGNOSIS — E03.9 ACQUIRED HYPOTHYROIDISM: ICD-10-CM

## 2023-07-24 RX ORDER — LEVOTHYROXINE SODIUM 0.05 MG/1
TABLET ORAL
Qty: 90 TABLET | Refills: 2 | Status: SHIPPED | OUTPATIENT
Start: 2023-07-24

## 2023-11-16 DIAGNOSIS — R92.8 ABNORMAL MAMMOGRAM OF RIGHT BREAST: Primary | ICD-10-CM

## 2023-11-16 DIAGNOSIS — R92.343 EXTREMELY DENSE TISSUE OF BOTH BREASTS ON MAMMOGRAPHY: ICD-10-CM

## 2023-12-04 ASSESSMENT — PATIENT HEALTH QUESTIONNAIRE - PHQ9
2. FEELING DOWN, DEPRESSED OR HOPELESS: NOT AT ALL
SUM OF ALL RESPONSES TO PHQ9 QUESTIONS 1 & 2: 0
5. POOR APPETITE OR OVEREATING: NOT AT ALL
6. FEELING BAD ABOUT YOURSELF - OR THAT YOU ARE A FAILURE OR HAVE LET YOURSELF OR YOUR FAMILY DOWN: NOT AT ALL
3. TROUBLE FALLING OR STAYING ASLEEP: 0
1. LITTLE INTEREST OR PLEASURE IN DOING THINGS: 0
6. FEELING BAD ABOUT YOURSELF - OR THAT YOU ARE A FAILURE OR HAVE LET YOURSELF OR YOUR FAMILY DOWN: 0
2. FEELING DOWN, DEPRESSED OR HOPELESS: 0
SUM OF ALL RESPONSES TO PHQ QUESTIONS 1-9: 0
3. TROUBLE FALLING OR STAYING ASLEEP: NOT AT ALL
SUM OF ALL RESPONSES TO PHQ QUESTIONS 1-9: 0
4. FEELING TIRED OR HAVING LITTLE ENERGY: NOT AT ALL
7. TROUBLE CONCENTRATING ON THINGS, SUCH AS READING THE NEWSPAPER OR WATCHING TELEVISION: 0
9. THOUGHTS THAT YOU WOULD BE BETTER OFF DEAD, OR OF HURTING YOURSELF: NOT AT ALL
SUM OF ALL RESPONSES TO PHQ QUESTIONS 1-9: 0
7. TROUBLE CONCENTRATING ON THINGS, SUCH AS READING THE NEWSPAPER OR WATCHING TELEVISION: NOT AT ALL
10. IF YOU CHECKED OFF ANY PROBLEMS, HOW DIFFICULT HAVE THESE PROBLEMS MADE IT FOR YOU TO DO YOUR WORK, TAKE CARE OF THINGS AT HOME, OR GET ALONG WITH OTHER PEOPLE: NOT DIFFICULT AT ALL
8. MOVING OR SPEAKING SO SLOWLY THAT OTHER PEOPLE COULD HAVE NOTICED. OR THE OPPOSITE - BEING SO FIDGETY OR RESTLESS THAT YOU HAVE BEEN MOVING AROUND A LOT MORE THAN USUAL: NOT AT ALL
SUM OF ALL RESPONSES TO PHQ QUESTIONS 1-9: 0
SUM OF ALL RESPONSES TO PHQ QUESTIONS 1-9: 0
5. POOR APPETITE OR OVEREATING: 0
1. LITTLE INTEREST OR PLEASURE IN DOING THINGS: NOT AT ALL
10. IF YOU CHECKED OFF ANY PROBLEMS, HOW DIFFICULT HAVE THESE PROBLEMS MADE IT FOR YOU TO DO YOUR WORK, TAKE CARE OF THINGS AT HOME, OR GET ALONG WITH OTHER PEOPLE: 0
8. MOVING OR SPEAKING SO SLOWLY THAT OTHER PEOPLE COULD HAVE NOTICED. OR THE OPPOSITE, BEING SO FIGETY OR RESTLESS THAT YOU HAVE BEEN MOVING AROUND A LOT MORE THAN USUAL: 0
9. THOUGHTS THAT YOU WOULD BE BETTER OFF DEAD, OR OF HURTING YOURSELF: 0
4. FEELING TIRED OR HAVING LITTLE ENERGY: 0

## 2023-12-06 ENCOUNTER — OFFICE VISIT (OUTPATIENT)
Dept: FAMILY MEDICINE CLINIC | Age: 43
End: 2023-12-06
Payer: COMMERCIAL

## 2023-12-06 ENCOUNTER — TELEPHONE (OUTPATIENT)
Dept: FAMILY MEDICINE CLINIC | Age: 43
End: 2023-12-06

## 2023-12-06 VITALS
HEART RATE: 57 BPM | HEIGHT: 67 IN | WEIGHT: 158 LBS | BODY MASS INDEX: 24.8 KG/M2 | SYSTOLIC BLOOD PRESSURE: 130 MMHG | OXYGEN SATURATION: 99 % | DIASTOLIC BLOOD PRESSURE: 74 MMHG

## 2023-12-06 DIAGNOSIS — Z00.00 ENCOUNTER FOR WELL ADULT EXAM WITHOUT ABNORMAL FINDINGS: Primary | ICD-10-CM

## 2023-12-06 DIAGNOSIS — Z00.00 ENCOUNTER FOR WELL ADULT EXAM WITHOUT ABNORMAL FINDINGS: ICD-10-CM

## 2023-12-06 DIAGNOSIS — E03.9 ACQUIRED HYPOTHYROIDISM: ICD-10-CM

## 2023-12-06 DIAGNOSIS — E55.9 VITAMIN D DEFICIENCY: ICD-10-CM

## 2023-12-06 DIAGNOSIS — F33.42 RECURRENT MAJOR DEPRESSIVE DISORDER, IN FULL REMISSION (HCC): ICD-10-CM

## 2023-12-06 LAB
25(OH)D3 SERPL-MCNC: 62.9 NG/ML
CHOLEST SERPL-MCNC: 293 MG/DL (ref 0–199)
GLUCOSE P FAST SERPL-MCNC: 81 MG/DL (ref 70–99)
HDLC SERPL-MCNC: 103 MG/DL (ref 40–60)
LDLC SERPL CALC-MCNC: 178 MG/DL
TRIGL SERPL-MCNC: 58 MG/DL (ref 0–150)
TSH SERPL DL<=0.005 MIU/L-ACNC: 1.6 UIU/ML (ref 0.27–4.2)
VLDLC SERPL CALC-MCNC: 12 MG/DL

## 2023-12-06 PROCEDURE — 99396 PREV VISIT EST AGE 40-64: CPT | Performed by: FAMILY MEDICINE

## 2023-12-06 RX ORDER — MELATONIN
1000 2 TIMES DAILY
Qty: 90 TABLET | Refills: 1 | COMMUNITY
Start: 2023-12-06

## 2023-12-06 SDOH — ECONOMIC STABILITY: FOOD INSECURITY: WITHIN THE PAST 12 MONTHS, THE FOOD YOU BOUGHT JUST DIDN'T LAST AND YOU DIDN'T HAVE MONEY TO GET MORE.: NEVER TRUE

## 2023-12-06 SDOH — ECONOMIC STABILITY: HOUSING INSECURITY
IN THE LAST 12 MONTHS, WAS THERE A TIME WHEN YOU DID NOT HAVE A STEADY PLACE TO SLEEP OR SLEPT IN A SHELTER (INCLUDING NOW)?: NO

## 2023-12-06 SDOH — ECONOMIC STABILITY: INCOME INSECURITY: HOW HARD IS IT FOR YOU TO PAY FOR THE VERY BASICS LIKE FOOD, HOUSING, MEDICAL CARE, AND HEATING?: NOT HARD AT ALL

## 2023-12-06 SDOH — ECONOMIC STABILITY: FOOD INSECURITY: WITHIN THE PAST 12 MONTHS, YOU WORRIED THAT YOUR FOOD WOULD RUN OUT BEFORE YOU GOT MONEY TO BUY MORE.: NEVER TRUE

## 2023-12-06 NOTE — PROGRESS NOTES
PHYSICAL-VISIT NOTE   Assessment and Plan:      Diagnosis Orders   1. Encounter for well adult exam without abnormal findings  Glucose, Fasting    Lipid Panel      2. Acquired hypothyroidism  TSH      3. Recurrent major depressive disorder, in full remission ([address])        4. Vitamin D deficiency  Vitamin D 25 Hydroxy    vitamin D3 (CHOLECALCIFEROL) 25 MCG (1000 UT) TABS tablet      Stable. Plan as above and below. INSTRUCTIONS  NEXT APPOINTMENT: Please schedule fasting annual physical (30 minutes) in one year. OK to have water, black coffee and medications (except for diabetes medicines) with Dr. Rudie Mortimer or her NP, Lemuel Cruz. PLEASE TAKE THIS FORM TO CHECK-OUT WINDOW TO SCHEDULE NEXT VISIT. PLEASE GET BLOODWORK DRAWN TODAY ON FIRST FLOOR in 170. Take orders with you. RESULTS- most blood tests back in couple days. We will call you if any problems. If bloodwork good, you will get letter in mail or notified thru [facility] (if signed up) within 2 weeks. If you do not, please call office. Ice after running. If heel gets worse will refer to sports medicine. Subjective:     Chief Complaint   Patient presents with    Annual Exam     Yearly PE       Francia Trejo is a 39 y.o. female who presents for annual testing/preventive review and check-up of medical problems listed below:  1. Encounter for well adult exam without abnormal findings    2. Acquired hypothyroidism    3. Recurrent major depressive disorder, in full remission ([address])    4. Vitamin D deficiency        Complaints: R heel pain, worse after increased running to 4 mi a day. Gets worse thru day, better upon awakening. Better with ice. X 2 months. Reduced back to 2 miles. ROS-See scanned \"Annual Adult Health Checklist\". Pertinent positives addressed above.     CHART REVIEW  Health Maintenance Due   Topic Date Due    Cervical cancer screen  Never done     Social History     Tobacco Use    Smoking status: Former     Types: Cigarettes     Quit

## 2023-12-06 NOTE — PATIENT INSTRUCTIONS
INSTRUCTIONS  NEXT APPOINTMENT: Please schedule fasting annual physical (30 minutes) in one year. OK to have water, black coffee and medications (except for diabetes medicines) with Dr. Claudio Seymour or her NP, Jennifer Del Rosario. PLEASE TAKE THIS FORM TO CHECK-OUT WINDOW TO SCHEDULE NEXT VISIT. PLEASE GET BLOODWORK DRAWN TODAY ON FIRST FLOOR in 170. Take orders with you. RESULTS- most blood tests back in couple days. We will call you if any problems. If bloodwork good, you will get letter in mail or notified thru [facility] (if signed up) within 2 weeks. If you do not, please call office. Ice after running. If heel gets worse will refer to sports medicine. Patient Education            Achilles Tendonitis Rehabilitation Exercises   You can do the towel stretch right away. When the towel stretch is too easy, try the standing calf stretch, soleus stretch, and plantar fascia stretch. When you no longer have sharp pain in your calf or tendon, you can do the heel raises, step-up, and static and dynamic balance exercises. Towel stretch: Sit on a hard surface with your injured leg stretched out in front of you. Loop a towel around the ball of your foot and pull the towel toward your body keeping your knee straight. Hold this position for 15 to 30 seconds then relax. Repeat 3 times. Standing calf stretch: Facing a wall, put your hands against the wall at about eye level. Keep the injured leg back, the uninjured leg forward, and the heel of your injured leg on the floor. Turn your injured foot slightly inward (as if you were pigeon-toed) as you slowly lean into the wall until you feel a stretch in the back of your calf. Hold for 15 to 30 seconds. Repeat 3 times. Do this exercise several times each day. Standing soleus stretch: Stand facing a wall with your hands at about chest level. With both knees slightly bent and the injured foot back, gently lean into the wall until you feel a stretch in your lower calf.  Once

## 2023-12-06 NOTE — TELEPHONE ENCOUNTER
----- Message from Tosha Pena sent at 12/6/2023  9:41 AM EST -----  Regarding: Thyroid Test  Contact: [phone]  I am downstairs for my blood test and they said that they can not do the thyroid test since it says it can only be done after March 2024. Is this a mistake in the electronic system?

## 2023-12-08 DIAGNOSIS — E78.5 HYPERLIPIDEMIA LDL GOAL <160: Primary | ICD-10-CM

## 2024-04-17 DIAGNOSIS — E03.9 ACQUIRED HYPOTHYROIDISM: ICD-10-CM

## 2024-04-17 RX ORDER — LEVOTHYROXINE SODIUM 0.05 MG/1
TABLET ORAL
Qty: 90 TABLET | Refills: 2 | Status: SHIPPED | OUTPATIENT
Start: 2024-04-17

## 2024-11-29 ENCOUNTER — TELEMEDICINE (OUTPATIENT)
Dept: FAMILY MEDICINE CLINIC | Age: 44
End: 2024-11-29

## 2024-11-29 DIAGNOSIS — R42 VERTIGO: Primary | ICD-10-CM

## 2024-11-29 RX ORDER — PREDNISONE 10 MG/1
TABLET ORAL
Qty: 20 TABLET | Refills: 0 | Status: SHIPPED | OUTPATIENT
Start: 2024-11-29

## 2024-11-29 ASSESSMENT — PATIENT HEALTH QUESTIONNAIRE - PHQ9
SUM OF ALL RESPONSES TO PHQ9 QUESTIONS 1 & 2: 0
SUM OF ALL RESPONSES TO PHQ QUESTIONS 1-9: 0
10. IF YOU CHECKED OFF ANY PROBLEMS, HOW DIFFICULT HAVE THESE PROBLEMS MADE IT FOR YOU TO DO YOUR WORK, TAKE CARE OF THINGS AT HOME, OR GET ALONG WITH OTHER PEOPLE: NOT DIFFICULT AT ALL
8. MOVING OR SPEAKING SO SLOWLY THAT OTHER PEOPLE COULD HAVE NOTICED. OR THE OPPOSITE, BEING SO FIGETY OR RESTLESS THAT YOU HAVE BEEN MOVING AROUND A LOT MORE THAN USUAL: NOT AT ALL
3. TROUBLE FALLING OR STAYING ASLEEP: NOT AT ALL
1. LITTLE INTEREST OR PLEASURE IN DOING THINGS: NOT AT ALL
5. POOR APPETITE OR OVEREATING: NOT AT ALL
SUM OF ALL RESPONSES TO PHQ QUESTIONS 1-9: 0
2. FEELING DOWN, DEPRESSED OR HOPELESS: NOT AT ALL
4. FEELING TIRED OR HAVING LITTLE ENERGY: NOT AT ALL
9. THOUGHTS THAT YOU WOULD BE BETTER OFF DEAD, OR OF HURTING YOURSELF: NOT AT ALL
SUM OF ALL RESPONSES TO PHQ QUESTIONS 1-9: 0
SUM OF ALL RESPONSES TO PHQ QUESTIONS 1-9: 0
6. FEELING BAD ABOUT YOURSELF - OR THAT YOU ARE A FAILURE OR HAVE LET YOURSELF OR YOUR FAMILY DOWN: NOT AT ALL
7. TROUBLE CONCENTRATING ON THINGS, SUCH AS READING THE NEWSPAPER OR WATCHING TELEVISION: NOT AT ALL

## 2024-11-29 NOTE — PROGRESS NOTES
conducted with patient's (and/or legal guardian's) consent. Patient identification was verified, and a caregiver was present when appropriate.   The patient was located at Home: [address] [place] [address]  Provider was located at Other: [place]  Confirm you are appropriately licensed, registered, or certified to deliver care in the state where the patient is located as indicated above. If you are not or unsure, please re-schedule the visit: Yes, I confirm.      Total time spent for this encounter: Not billed by time    --Jhonny Rosales MD on 12/2/2024 at 5:23 PM    An electronic signature was used to authenticate this note.

## 2024-12-20 ENCOUNTER — OFFICE VISIT (OUTPATIENT)
Dept: FAMILY MEDICINE CLINIC | Age: 44
End: 2024-12-20
Payer: COMMERCIAL

## 2024-12-20 VITALS
RESPIRATION RATE: 16 BRPM | WEIGHT: 158 LBS | SYSTOLIC BLOOD PRESSURE: 112 MMHG | HEART RATE: 68 BPM | OXYGEN SATURATION: 98 % | HEIGHT: 67 IN | BODY MASS INDEX: 24.8 KG/M2 | DIASTOLIC BLOOD PRESSURE: 68 MMHG

## 2024-12-20 DIAGNOSIS — E03.9 ACQUIRED HYPOTHYROIDISM: ICD-10-CM

## 2024-12-20 DIAGNOSIS — E78.5 HYPERLIPIDEMIA LDL GOAL <160: ICD-10-CM

## 2024-12-20 DIAGNOSIS — E55.9 VITAMIN D DEFICIENCY: ICD-10-CM

## 2024-12-20 DIAGNOSIS — Z00.00 ENCOUNTER FOR WELL ADULT EXAM WITHOUT ABNORMAL FINDINGS: Primary | ICD-10-CM

## 2024-12-20 DIAGNOSIS — G47.00 INSOMNIA, UNSPECIFIED TYPE: ICD-10-CM

## 2024-12-20 DIAGNOSIS — Z00.00 ENCOUNTER FOR WELL ADULT EXAM WITHOUT ABNORMAL FINDINGS: ICD-10-CM

## 2024-12-20 DIAGNOSIS — F33.42 RECURRENT MAJOR DEPRESSIVE DISORDER, IN FULL REMISSION (HCC): ICD-10-CM

## 2024-12-20 LAB
25(OH)D3 SERPL-MCNC: 39.8 NG/ML
ALBUMIN SERPL-MCNC: 4.6 G/DL (ref 3.4–5)
ALBUMIN/GLOB SERPL: 2.1 {RATIO} (ref 1.1–2.2)
ALP SERPL-CCNC: 39 U/L (ref 40–129)
ALT SERPL-CCNC: 18 U/L (ref 10–40)
ANION GAP SERPL CALCULATED.3IONS-SCNC: 11 MMOL/L (ref 3–16)
AST SERPL-CCNC: 24 U/L (ref 15–37)
BASOPHILS # BLD: 0 K/UL (ref 0–0.2)
BASOPHILS NFR BLD: 0.4 %
BILIRUB SERPL-MCNC: <0.2 MG/DL (ref 0–1)
BUN SERPL-MCNC: 11 MG/DL (ref 7–20)
CALCIUM SERPL-MCNC: 9.4 MG/DL (ref 8.3–10.6)
CHLORIDE SERPL-SCNC: 102 MMOL/L (ref 99–110)
CHOLEST SERPL-MCNC: 291 MG/DL (ref 0–199)
CO2 SERPL-SCNC: 26 MMOL/L (ref 21–32)
CREAT SERPL-MCNC: 0.6 MG/DL (ref 0.6–1.1)
DEPRECATED RDW RBC AUTO: 14.2 % (ref 12.4–15.4)
EOSINOPHIL # BLD: 0.2 K/UL (ref 0–0.6)
EOSINOPHIL NFR BLD: 5.8 %
GFR SERPLBLD CREATININE-BSD FMLA CKD-EPI: >90 ML/MIN/{1.73_M2}
GLUCOSE SERPL-MCNC: 74 MG/DL (ref 70–99)
HCT VFR BLD AUTO: 39.2 % (ref 36–48)
HDLC SERPL-MCNC: 95 MG/DL (ref 40–60)
HGB BLD-MCNC: 13.3 G/DL (ref 12–16)
LDLC SERPL CALC-MCNC: 181 MG/DL
LYMPHOCYTES # BLD: 1.1 K/UL (ref 1–5.1)
LYMPHOCYTES NFR BLD: 32.8 %
MCH RBC QN AUTO: 30.7 PG (ref 26–34)
MCHC RBC AUTO-ENTMCNC: 33.9 G/DL (ref 31–36)
MCV RBC AUTO: 90.6 FL (ref 80–100)
MONOCYTES # BLD: 0.3 K/UL (ref 0–1.3)
MONOCYTES NFR BLD: 8 %
NEUTROPHILS # BLD: 1.7 K/UL (ref 1.7–7.7)
NEUTROPHILS NFR BLD: 53 %
PLATELET # BLD AUTO: 252 K/UL (ref 135–450)
PMV BLD AUTO: 8 FL (ref 5–10.5)
POTASSIUM SERPL-SCNC: 4.1 MMOL/L (ref 3.5–5.1)
PROT SERPL-MCNC: 6.8 G/DL (ref 6.4–8.2)
RBC # BLD AUTO: 4.32 M/UL (ref 4–5.2)
SODIUM SERPL-SCNC: 139 MMOL/L (ref 136–145)
TRIGL SERPL-MCNC: 74 MG/DL (ref 0–150)
TSH SERPL DL<=0.005 MIU/L-ACNC: 1.29 UIU/ML (ref 0.27–4.2)
VLDLC SERPL CALC-MCNC: 15 MG/DL
WBC # BLD AUTO: 3.2 K/UL (ref 4–11)

## 2024-12-20 PROCEDURE — 99396 PREV VISIT EST AGE 40-64: CPT

## 2024-12-20 SDOH — ECONOMIC STABILITY: FOOD INSECURITY: WITHIN THE PAST 12 MONTHS, YOU WORRIED THAT YOUR FOOD WOULD RUN OUT BEFORE YOU GOT MONEY TO BUY MORE.: NEVER TRUE

## 2024-12-20 SDOH — ECONOMIC STABILITY: FOOD INSECURITY: WITHIN THE PAST 12 MONTHS, THE FOOD YOU BOUGHT JUST DIDN'T LAST AND YOU DIDN'T HAVE MONEY TO GET MORE.: NEVER TRUE

## 2024-12-20 SDOH — ECONOMIC STABILITY: INCOME INSECURITY: HOW HARD IS IT FOR YOU TO PAY FOR THE VERY BASICS LIKE FOOD, HOUSING, MEDICAL CARE, AND HEATING?: NOT HARD AT ALL

## 2024-12-20 NOTE — PROGRESS NOTES
Was out in heat for 7 hours yesterday, had 1 drink last night, awoke at 0100 today with n/v, fever today and aorta are normal. She exhibits no organomegaly, mass or bruit.   Genitourinary: examination not indicated and performed by gynecologist.  Breast exam:  risk and benefit of breast self-exam was discussed, not examined.  Musculoskeletal: Normal range of motion, no synovitis. She exhibits no edema.   Neurological: She is alert and oriented to person, place, and time. She has normal reflexes. No cranial nerve deficit. Coordination normal.   Skin: Skin is warm and dry. There is no rash or erythema.  No suspicious lesions noted.   Psychiatric: She has a normal mood and affect. Her speech is normal and behavior is normal. Judgment, cognition and memory are normal.     Assessment/Plan:    Vivi was seen today for annual exam.    Diagnoses and all orders for this visit:    Encounter for well adult exam without abnormal findings  -     CBC with Auto Differential; Future  -     TSH; Future  -     Comprehensive Metabolic Panel; Future  -     Lipid Panel; Future  -     Vitamin D 25 Hydroxy; Future  Discussed importance of healthy lifestyle habits including diet, aerobic exercise, routine dental/eye/gyn exams and  preventative screenings and vaccinations.  Acquired hypothyroidism  -     TSH; Future  Chronic- stable  Appears euthyroid  Will recheck TSH for monitoring- will make adjustments as indicated  She needs a refill of her levothyroxine once labs are back  Hyperlipidemia LDL goal <160  -     Comprehensive Metabolic Panel; Future  -     Lipid Panel; Future  Chronic- stable  LDL was elevated last years, as was her HDL  Will repeat today  Discussed low cholesterol diet and daily aerobic exercise  Vitamin D deficiency  -     Vitamin D 25 Hydroxy; Future  Chronic- stable  Vitamin D level today for monitoring  Recurrent major depressive disorder, in full remission (HCC)  Chronic- stable  Managed per psych  Insomnia, unspecified type  Chronic- stable  Managed per psych

## 2024-12-31 DIAGNOSIS — E03.9 ACQUIRED HYPOTHYROIDISM: ICD-10-CM

## 2024-12-31 NOTE — TELEPHONE ENCOUNTER
Patient needs a refill on her Synthroid.  Patient states refill was contingent on her lab results.  Thank you.  Med is pended.

## 2025-01-02 RX ORDER — LEVOTHYROXINE SODIUM 50 UG/1
TABLET ORAL
Qty: 90 TABLET | Refills: 2 | Status: SHIPPED | OUTPATIENT
Start: 2025-01-02